# Patient Record
Sex: MALE | Race: WHITE | NOT HISPANIC OR LATINO | Employment: OTHER | URBAN - METROPOLITAN AREA
[De-identification: names, ages, dates, MRNs, and addresses within clinical notes are randomized per-mention and may not be internally consistent; named-entity substitution may affect disease eponyms.]

---

## 2023-03-29 DIAGNOSIS — I10 HYPERTENSION, UNSPECIFIED TYPE: Primary | ICD-10-CM

## 2023-03-29 PROBLEM — R53.83 FATIGUE: Status: ACTIVE | Noted: 2023-03-29

## 2023-03-29 PROBLEM — M25.552 LEFT HIP PAIN: Status: ACTIVE | Noted: 2023-03-29

## 2023-03-29 PROBLEM — G25.0 BENIGN FAMILIAL TREMOR: Status: ACTIVE | Noted: 2023-03-29

## 2023-03-29 PROBLEM — M10.9 GOUT: Status: ACTIVE | Noted: 2023-03-29

## 2023-03-29 PROBLEM — G47.52 REM BEHAVIORAL DISORDER: Status: ACTIVE | Noted: 2023-03-29

## 2023-03-29 PROBLEM — R17 ELEVATED BILIRUBIN: Status: ACTIVE | Noted: 2023-03-29

## 2023-03-29 PROBLEM — E55.9 VITAMIN D DEFICIENCY: Status: ACTIVE | Noted: 2023-03-29

## 2023-03-29 PROBLEM — G47.30 SLEEP APNEA: Status: ACTIVE | Noted: 2023-03-29

## 2023-03-29 PROBLEM — G47.61 PERIODIC LIMB MOVEMENT DISORDER (PLMD): Status: ACTIVE | Noted: 2023-03-29

## 2023-03-29 PROBLEM — M16.12 PRIMARY OSTEOARTHRITIS OF LEFT HIP: Status: ACTIVE | Noted: 2023-03-29

## 2023-03-29 PROBLEM — H91.90 HEARING LOSS: Status: ACTIVE | Noted: 2023-03-29

## 2023-03-29 PROBLEM — L30.9 ECZEMA: Status: ACTIVE | Noted: 2023-03-29

## 2023-03-29 PROBLEM — M17.12 ARTHRITIS OF KNEE, LEFT: Status: ACTIVE | Noted: 2023-03-29

## 2023-03-29 RX ORDER — LISINOPRIL 5 MG/1
5 TABLET ORAL DAILY
COMMUNITY
Start: 2023-03-08 | End: 2023-03-29 | Stop reason: SDUPTHER

## 2023-03-29 RX ORDER — PENICILLIN V POTASSIUM 500 MG/1
500 TABLET, FILM COATED ORAL 3 TIMES DAILY
COMMUNITY
Start: 2023-01-16 | End: 2023-10-12 | Stop reason: ALTCHOICE

## 2023-03-29 RX ORDER — MULTIVITAMIN
1 TABLET ORAL DAILY
COMMUNITY

## 2023-03-29 RX ORDER — MELOXICAM 15 MG/1
15 TABLET ORAL DAILY
COMMUNITY
End: 2023-11-30

## 2023-03-29 RX ORDER — LISINOPRIL 10 MG/1
10 TABLET ORAL DAILY
Qty: 30 TABLET | Refills: 5 | Status: SHIPPED | OUTPATIENT
Start: 2023-03-29 | End: 2023-08-23 | Stop reason: SDUPTHER

## 2023-03-29 RX ORDER — CALCIUM CARBONATE 300MG(750)
400 TABLET,CHEWABLE ORAL 2 TIMES DAILY
COMMUNITY
End: 2023-11-30

## 2023-03-29 RX ORDER — TACROLIMUS 1 MG/G
1 OINTMENT TOPICAL 2 TIMES DAILY
COMMUNITY
Start: 2021-08-05 | End: 2023-11-30

## 2023-03-29 RX ORDER — PROPRANOLOL HYDROCHLORIDE 20 MG/1
20 TABLET ORAL 2 TIMES DAILY
COMMUNITY
Start: 2015-07-15 | End: 2023-10-30 | Stop reason: SDUPTHER

## 2023-06-27 ENCOUNTER — TELEPHONE (OUTPATIENT)
Dept: PRIMARY CARE | Facility: CLINIC | Age: 63
End: 2023-06-27
Payer: MEDICARE

## 2023-06-27 NOTE — TELEPHONE ENCOUNTER
Patient called regarding his CPAP machine. He said since he switched insurances, he is required to prove compliance to them. He said paperwork was faxed over here for Rudy to fill out. It should be from Medicare. He just wants to follow up and see if that's been completed or not.

## 2023-08-23 DIAGNOSIS — I10 HYPERTENSION, UNSPECIFIED TYPE: ICD-10-CM

## 2023-08-24 RX ORDER — LISINOPRIL 10 MG/1
10 TABLET ORAL DAILY
Qty: 90 TABLET | Refills: 1 | Status: SHIPPED | OUTPATIENT
Start: 2023-08-24 | End: 2023-09-21 | Stop reason: SDUPTHER

## 2023-09-20 ENCOUNTER — TELEPHONE (OUTPATIENT)
Dept: PRIMARY CARE | Facility: CLINIC | Age: 63
End: 2023-09-20
Payer: MEDICARE

## 2023-09-21 DIAGNOSIS — I10 HYPERTENSION, UNSPECIFIED TYPE: ICD-10-CM

## 2023-09-21 RX ORDER — LISINOPRIL 10 MG/1
10 TABLET ORAL DAILY
Qty: 90 TABLET | Refills: 1 | Status: SHIPPED | OUTPATIENT
Start: 2023-09-21 | End: 2024-01-25 | Stop reason: SDUPTHER

## 2023-10-12 ENCOUNTER — OFFICE VISIT (OUTPATIENT)
Dept: PRIMARY CARE | Facility: CLINIC | Age: 63
End: 2023-10-12
Payer: MEDICARE

## 2023-10-12 VITALS
RESPIRATION RATE: 18 BRPM | WEIGHT: 247 LBS | BODY MASS INDEX: 36.48 KG/M2 | SYSTOLIC BLOOD PRESSURE: 132 MMHG | TEMPERATURE: 98.4 F | HEART RATE: 75 BPM | DIASTOLIC BLOOD PRESSURE: 70 MMHG | OXYGEN SATURATION: 97 %

## 2023-10-12 DIAGNOSIS — M10.9 ACUTE GOUT INVOLVING TOE OF RIGHT FOOT, UNSPECIFIED CAUSE: Primary | ICD-10-CM

## 2023-10-12 PROBLEM — M25.552 LEFT HIP PAIN: Status: RESOLVED | Noted: 2023-03-29 | Resolved: 2023-10-12

## 2023-10-12 PROBLEM — M16.12 PRIMARY OSTEOARTHRITIS OF LEFT HIP: Status: RESOLVED | Noted: 2023-03-29 | Resolved: 2023-10-12

## 2023-10-12 PROCEDURE — 1160F RVW MEDS BY RX/DR IN RCRD: CPT | Performed by: FAMILY MEDICINE

## 2023-10-12 PROCEDURE — 1159F MED LIST DOCD IN RCRD: CPT | Performed by: FAMILY MEDICINE

## 2023-10-12 PROCEDURE — 99214 OFFICE O/P EST MOD 30 MIN: CPT | Performed by: FAMILY MEDICINE

## 2023-10-12 PROCEDURE — 1036F TOBACCO NON-USER: CPT | Performed by: FAMILY MEDICINE

## 2023-10-12 RX ORDER — INDOMETHACIN 50 MG/1
50 CAPSULE ORAL
Qty: 20 CAPSULE | Refills: 0 | Status: SHIPPED | OUTPATIENT
Start: 2023-10-12 | End: 2023-10-22

## 2023-10-12 ASSESSMENT — ENCOUNTER SYMPTOMS
JOINT SWELLING: 1
WHEEZING: 0
FEVER: 0
SHORTNESS OF BREATH: 0
DIFFICULTY URINATING: 0
GASTROINTESTINAL NEGATIVE: 1
ARTHRALGIAS: 1
COUGH: 0
HEMATURIA: 0

## 2023-10-12 NOTE — PROGRESS NOTES
Subjective   Patient ID: Juvenal Bagley Jr. is a 63 y.o. male who presents for Foot Swelling. Swelling/pain/redness in right foot middle toe. PT suspects gout flare up. PT soaking in epsom salts with no relief    HPI     Review of Systems   Constitutional:  Negative for fever.   HENT: Negative.     Respiratory:  Negative for cough, shortness of breath and wheezing.    Cardiovascular:  Negative for chest pain.   Gastrointestinal: Negative.    Genitourinary:  Negative for difficulty urinating and hematuria.   Musculoskeletal:  Positive for arthralgias and joint swelling.        Gout symptoms x 1 wk. Right 3rd toe . Painful red and swollen. This is recurrent flare. Taking ibuprofen, ice, epsom salt voltaren topical.  Denies trauma or strenuous activity.       Objective   /70   Pulse 75   Temp 36.9 °C (98.4 °F)   Resp 18   Wt 112 kg (247 lb)   SpO2 97%   BMI 36.48 kg/m²     Physical Exam  Vitals and nursing note reviewed.   Constitutional:       General: He is not in acute distress.     Appearance: Normal appearance.   Cardiovascular:      Rate and Rhythm: Normal rate and regular rhythm.      Heart sounds: Normal heart sounds.   Pulmonary:      Effort: Pulmonary effort is normal.      Breath sounds: Normal breath sounds.   Musculoskeletal:         General: Swelling and tenderness present.      Comments: R 3rd toe with tenderness erythema edema and warmth. FROM. Neurovasc intact.  No evidence of trauma or injury or skin breakdown   Neurological:      Mental Status: He is alert.         Assessment/Plan   Problem List Items Addressed This Visit             ICD-10-CM    Gout - Primary M10.9     Pt to take med as directed  Rest, elevate foot, no strenuous activity  F/up if no improvement         Relevant Medications    indomethacin (Indocin) 50 mg capsule

## 2023-10-14 PROBLEM — I10 BENIGN ESSENTIAL HYPERTENSION: Status: ACTIVE | Noted: 2023-10-14

## 2023-10-14 PROBLEM — M25.562 KNEE PAIN, LEFT: Status: ACTIVE | Noted: 2023-10-14

## 2023-10-14 RX ORDER — AZELAIC ACID 0.15 G/G
GEL TOPICAL EVERY MORNING
COMMUNITY
End: 2023-11-30

## 2023-10-14 RX ORDER — CLOBETASOL PROPIONATE 0.5 MG/G
CREAM TOPICAL
COMMUNITY
End: 2023-11-30

## 2023-10-16 ENCOUNTER — OFFICE VISIT (OUTPATIENT)
Dept: PRIMARY CARE | Facility: CLINIC | Age: 63
End: 2023-10-16
Payer: MEDICARE

## 2023-10-16 ENCOUNTER — LAB (OUTPATIENT)
Dept: LAB | Facility: LAB | Age: 63
End: 2023-10-16
Payer: MEDICARE

## 2023-10-16 VITALS
BODY MASS INDEX: 36.92 KG/M2 | WEIGHT: 250 LBS | RESPIRATION RATE: 16 BRPM | DIASTOLIC BLOOD PRESSURE: 82 MMHG | SYSTOLIC BLOOD PRESSURE: 132 MMHG

## 2023-10-16 DIAGNOSIS — Z00.00 ROUTINE GENERAL MEDICAL EXAMINATION AT HEALTH CARE FACILITY: ICD-10-CM

## 2023-10-16 DIAGNOSIS — R17 ELEVATED BILIRUBIN: ICD-10-CM

## 2023-10-16 DIAGNOSIS — E55.9 VITAMIN D DEFICIENCY: ICD-10-CM

## 2023-10-16 DIAGNOSIS — Z13.220 LIPID SCREENING: ICD-10-CM

## 2023-10-16 DIAGNOSIS — I10 BENIGN ESSENTIAL HYPERTENSION: ICD-10-CM

## 2023-10-16 DIAGNOSIS — R53.83 OTHER FATIGUE: ICD-10-CM

## 2023-10-16 DIAGNOSIS — Z13.220 LIPID SCREENING: Primary | ICD-10-CM

## 2023-10-16 DIAGNOSIS — Z23 NEED FOR VACCINATION: ICD-10-CM

## 2023-10-16 LAB
25(OH)D3 SERPL-MCNC: 63 NG/ML (ref 30–100)
ALBUMIN SERPL BCP-MCNC: 4.6 G/DL (ref 3.4–5)
ALP SERPL-CCNC: 65 U/L (ref 33–136)
ALT SERPL W P-5'-P-CCNC: 34 U/L (ref 10–52)
ANION GAP SERPL CALC-SCNC: 13 MMOL/L (ref 10–20)
AST SERPL W P-5'-P-CCNC: 30 U/L (ref 9–39)
BASOPHILS # BLD AUTO: 0.08 X10*3/UL (ref 0–0.1)
BASOPHILS NFR BLD AUTO: 1.1 %
BILIRUB SERPL-MCNC: 1.1 MG/DL (ref 0–1.2)
BUN SERPL-MCNC: 16 MG/DL (ref 6–23)
CALCIUM SERPL-MCNC: 9.6 MG/DL (ref 8.6–10.3)
CHLORIDE SERPL-SCNC: 104 MMOL/L (ref 98–107)
CHOLEST SERPL-MCNC: 157 MG/DL (ref 0–199)
CHOLESTEROL/HDL RATIO: 4.2
CO2 SERPL-SCNC: 27 MMOL/L (ref 21–32)
CREAT SERPL-MCNC: 0.97 MG/DL (ref 0.5–1.3)
EOSINOPHIL # BLD AUTO: 0.37 X10*3/UL (ref 0–0.7)
EOSINOPHIL NFR BLD AUTO: 4.9 %
ERYTHROCYTE [DISTWIDTH] IN BLOOD BY AUTOMATED COUNT: 12.7 % (ref 11.5–14.5)
GFR SERPL CREATININE-BSD FRML MDRD: 87 ML/MIN/1.73M*2
GLUCOSE SERPL-MCNC: 83 MG/DL (ref 74–99)
HCT VFR BLD AUTO: 43.6 % (ref 41–52)
HDLC SERPL-MCNC: 37 MG/DL
HGB BLD-MCNC: 14.4 G/DL (ref 13.5–17.5)
IMM GRANULOCYTES # BLD AUTO: 0.08 X10*3/UL (ref 0–0.7)
IMM GRANULOCYTES NFR BLD AUTO: 1.1 % (ref 0–0.9)
LDLC SERPL CALC-MCNC: 93 MG/DL
LYMPHOCYTES # BLD AUTO: 2.07 X10*3/UL (ref 1.2–4.8)
LYMPHOCYTES NFR BLD AUTO: 27.6 %
MCH RBC QN AUTO: 31.6 PG (ref 26–34)
MCHC RBC AUTO-ENTMCNC: 33 G/DL (ref 32–36)
MCV RBC AUTO: 96 FL (ref 80–100)
MONOCYTES # BLD AUTO: 0.78 X10*3/UL (ref 0.1–1)
MONOCYTES NFR BLD AUTO: 10.4 %
NEUTROPHILS # BLD AUTO: 4.12 X10*3/UL (ref 1.2–7.7)
NEUTROPHILS NFR BLD AUTO: 54.9 %
NON HDL CHOLESTEROL: 120 MG/DL (ref 0–149)
NRBC BLD-RTO: 0 /100 WBCS (ref 0–0)
PLATELET # BLD AUTO: 240 X10*3/UL (ref 150–450)
PMV BLD AUTO: 11.3 FL (ref 7.5–11.5)
POTASSIUM SERPL-SCNC: 4.4 MMOL/L (ref 3.5–5.3)
PROT SERPL-MCNC: 7.1 G/DL (ref 6.4–8.2)
RBC # BLD AUTO: 4.55 X10*6/UL (ref 4.5–5.9)
SODIUM SERPL-SCNC: 140 MMOL/L (ref 136–145)
TRIGL SERPL-MCNC: 137 MG/DL (ref 0–149)
TSH SERPL-ACNC: 1.91 MIU/L (ref 0.44–3.98)
VLDL: 27 MG/DL (ref 0–40)
WBC # BLD AUTO: 7.5 X10*3/UL (ref 4.4–11.3)

## 2023-10-16 PROCEDURE — 1160F RVW MEDS BY RX/DR IN RCRD: CPT | Performed by: NURSE PRACTITIONER

## 2023-10-16 PROCEDURE — 80061 LIPID PANEL: CPT

## 2023-10-16 PROCEDURE — 84443 ASSAY THYROID STIM HORMONE: CPT

## 2023-10-16 PROCEDURE — 1036F TOBACCO NON-USER: CPT | Performed by: NURSE PRACTITIONER

## 2023-10-16 PROCEDURE — 3079F DIAST BP 80-89 MM HG: CPT | Performed by: NURSE PRACTITIONER

## 2023-10-16 PROCEDURE — 80053 COMPREHEN METABOLIC PANEL: CPT

## 2023-10-16 PROCEDURE — 85025 COMPLETE CBC W/AUTO DIFF WBC: CPT

## 2023-10-16 PROCEDURE — 1159F MED LIST DOCD IN RCRD: CPT | Performed by: NURSE PRACTITIONER

## 2023-10-16 PROCEDURE — G0402 INITIAL PREVENTIVE EXAM: HCPCS | Performed by: NURSE PRACTITIONER

## 2023-10-16 PROCEDURE — 90662 IIV NO PRSV INCREASED AG IM: CPT | Performed by: NURSE PRACTITIONER

## 2023-10-16 PROCEDURE — 3077F SYST BP >= 140 MM HG: CPT | Performed by: NURSE PRACTITIONER

## 2023-10-16 PROCEDURE — 1170F FXNL STATUS ASSESSED: CPT | Performed by: NURSE PRACTITIONER

## 2023-10-16 PROCEDURE — G0008 ADMIN INFLUENZA VIRUS VAC: HCPCS | Performed by: NURSE PRACTITIONER

## 2023-10-16 PROCEDURE — 82306 VITAMIN D 25 HYDROXY: CPT

## 2023-10-16 PROCEDURE — 36415 COLL VENOUS BLD VENIPUNCTURE: CPT

## 2023-10-16 ASSESSMENT — ENCOUNTER SYMPTOMS
DEPRESSION: 0
OCCASIONAL FEELINGS OF UNSTEADINESS: 0
LOSS OF SENSATION IN FEET: 0

## 2023-10-16 ASSESSMENT — PATIENT HEALTH QUESTIONNAIRE - PHQ9
2. FEELING DOWN, DEPRESSED OR HOPELESS: NOT AT ALL
1. LITTLE INTEREST OR PLEASURE IN DOING THINGS: NOT AT ALL
SUM OF ALL RESPONSES TO PHQ9 QUESTIONS 1 AND 2: 0

## 2023-10-16 ASSESSMENT — ACTIVITIES OF DAILY LIVING (ADL)
BATHING: INDEPENDENT
TAKING_MEDICATION: INDEPENDENT
DOING_HOUSEWORK: INDEPENDENT
GROCERY_SHOPPING: INDEPENDENT
DRESSING: INDEPENDENT
MANAGING_FINANCES: INDEPENDENT

## 2023-10-16 NOTE — PROGRESS NOTES
Subjective   Reason for Visit: Juvenal Bagley Jr. is an 63 y.o. male here for a Medicare Wellness visit.     Past Medical, Surgical, and Family History reviewed and updated in chart.Sushant in 2021    Reviewed all medications by prescribing practitioner or clinical pharmacist (such as prescriptions, OTCs, herbal therapies and supplements) and documented in the medical record.    He is taking ibuprofen prn for IT band tendonitis and bursitis of hips. Biking daily, and volunteering with bike co-ops. Feels well overall, no problems or concerns.    HPI    Patient Care Team:  JUSTO Fishman-CNP as PCP - General (Internal Medicine)     Review of Systems   Constitutional:  Negative for chills, fatigue and fever.   HENT:  Negative for congestion, ear pain, rhinorrhea, sinus pressure and sore throat.    Eyes:  Negative for pain, discharge and itching.   Respiratory:  Negative for cough, shortness of breath and wheezing.    Cardiovascular:  Negative for chest pain and palpitations.   Gastrointestinal:  Negative for constipation, diarrhea, nausea and vomiting.   Genitourinary:  Negative for difficulty urinating and dysuria.   Musculoskeletal:  Positive for arthralgias. Negative for back pain, joint swelling and myalgias.   Skin:  Negative for color change.   Neurological:  Negative for headaches.   Hematological:  Negative for adenopathy.   Psychiatric/Behavioral:  Negative for decreased concentration. The patient is not nervous/anxious.        Objective   Vitals:  /82   Resp 16   Wt 113 kg (250 lb)   BMI 36.92 kg/m²       Physical Exam  Constitutional:       General: He is not in acute distress.     Appearance: He is not ill-appearing.   HENT:      Head: Normocephalic and atraumatic.      Mouth/Throat:      Mouth: Mucous membranes are moist.      Pharynx: Oropharynx is clear.   Eyes:      Conjunctiva/sclera: Conjunctivae normal.      Pupils: Pupils are equal, round, and reactive to light.   Cardiovascular:       Rate and Rhythm: Normal rate and regular rhythm.      Pulses: Normal pulses.      Heart sounds: Normal heart sounds.   Pulmonary:      Effort: Pulmonary effort is normal. No respiratory distress.      Breath sounds: Normal breath sounds.   Abdominal:      General: Bowel sounds are normal.      Palpations: Abdomen is soft.      Tenderness: There is no abdominal tenderness.   Musculoskeletal:         General: Normal range of motion.   Skin:     General: Skin is warm and dry.   Neurological:      General: No focal deficit present.      Mental Status: He is alert and oriented to person, place, and time.   Psychiatric:         Mood and Affect: Mood normal.         Behavior: Behavior normal.         Thought Content: Thought content normal.         Judgment: Judgment normal.         Assessment/Plan   Problem List Items Addressed This Visit       Elevated bilirubin    Relevant Orders    Comprehensive Metabolic Panel (Completed)    Fatigue    Relevant Orders    TSH with reflex to Free T4 if abnormal (Completed)    Vitamin D deficiency    Relevant Orders    Vitamin D 25-Hydroxy,Total (for eval of Vitamin D levels) (Completed)    Benign essential hypertension    Relevant Orders    CBC and Auto Differential (Completed)    Comprehensive Metabolic Panel (Completed)     Other Visit Diagnoses       Lipid screening    -  Primary    Relevant Orders    Lipid Panel (Completed)    Need for vaccination        Relevant Orders    Flu vaccine, high dose seasonal, preservative free (Completed)    Routine general medical examination at health care facility        Relevant Orders    1 Year Follow Up In [facility] - PCP - Wellness Exam

## 2023-10-30 ENCOUNTER — OFFICE VISIT (OUTPATIENT)
Dept: NEUROLOGY | Facility: CLINIC | Age: 63
End: 2023-10-30
Payer: MEDICARE

## 2023-10-30 VITALS
SYSTOLIC BLOOD PRESSURE: 161 MMHG | DIASTOLIC BLOOD PRESSURE: 81 MMHG | HEIGHT: 69 IN | BODY MASS INDEX: 35.4 KG/M2 | WEIGHT: 239 LBS | HEART RATE: 59 BPM

## 2023-10-30 DIAGNOSIS — G25.0 ESSENTIAL TREMOR: Primary | ICD-10-CM

## 2023-10-30 PROCEDURE — 1036F TOBACCO NON-USER: CPT | Performed by: PSYCHIATRY & NEUROLOGY

## 2023-10-30 PROCEDURE — 99213 OFFICE O/P EST LOW 20 MIN: CPT | Performed by: PSYCHIATRY & NEUROLOGY

## 2023-10-30 PROCEDURE — 1160F RVW MEDS BY RX/DR IN RCRD: CPT | Performed by: PSYCHIATRY & NEUROLOGY

## 2023-10-30 PROCEDURE — 3077F SYST BP >= 140 MM HG: CPT | Performed by: PSYCHIATRY & NEUROLOGY

## 2023-10-30 PROCEDURE — 3079F DIAST BP 80-89 MM HG: CPT | Performed by: PSYCHIATRY & NEUROLOGY

## 2023-10-30 PROCEDURE — 1159F MED LIST DOCD IN RCRD: CPT | Performed by: PSYCHIATRY & NEUROLOGY

## 2023-10-30 RX ORDER — PROPRANOLOL HYDROCHLORIDE 20 MG/1
20 TABLET ORAL 2 TIMES DAILY
Qty: 180 TABLET | Refills: 3 | Status: SHIPPED | OUTPATIENT
Start: 2023-10-30 | End: 2024-10-29

## 2023-10-30 ASSESSMENT — FAHN TOLOSA MARTIN TREMOR (FTM)
DRAWING C TOTAL SCORE: 1
UE FINGER TO NOSE AND OTHER ACTIONS: 1
UE ARMS OUTSTRECHED WRISTS MILDLY EXTENDED FINGERS SPREAD APART: 0
TOUNGE AT REST: 0
FACE AT POSTURE WHEN STANDING OR SITTING: 0
UE ARM AT REST: 0
DRAWING C LEFT SCORE: 1
LE LEG FLEXED AT HIPS AND KNEES AT POSTURE: 0
DRAWING A TOTAL SCORE: 1
DRAWING A RIGHT SCORE: 0
RUE TOTAL SCORE: 2
TOUNGE TOTAL SCORE: 0
RUE TOTAL SCORE: 1
UE ARMS OUTSTRECHED WRISTS MILDLY EXTENDED FINGERS SPREAD APART: 0
DRAWING B RGHT SCORE: 0
VOICE TOTAL SCORE: 1
VOICE IN ACTION: 1
LE TOE TO FINGER IN A FLEXED POSTURE: 0
RLE TOTAL SCORE: 0
UE ARM AT REST: 1
LE LEG FLEXED AT HIPS AND KNEES AT POSTURE: 0
LE TOE TO FINGER IN A FLEXED POSTURE: 0
TRUNK  WHEN SITTING OR STANDING: 0
TOUNGE WHEN PROTUDED: 0
DRAWING C RIGHT SCORE: 0
LLE TOTAL SCORE: 0
LE AT REST: 0
HANDWRITING WITH DOMINANT HAND: 1
DRAWING B LEFT SCORE: 3
UE FINGER TO NOSE AND OTHER ACTIONS: 1
LE AT REST: 0
DRAWING B TOTAL SCORE: 3
HEAD AT POSTURE WHEN STANDING OR SITTING: 0
DRAWING A LEFT SCORE: 1

## 2023-10-30 ASSESSMENT — ENCOUNTER SYMPTOMS
DEPRESSION: 0
OCCASIONAL FEELINGS OF UNSTEADINESS: 0
LOSS OF SENSATION IN FEET: 0
TREMORS: 1

## 2023-10-30 NOTE — PATIENT INSTRUCTIONS
It was nice to see you today!    Continue current dose of Propranolol.     RTC in 9-12 months

## 2023-10-30 NOTE — LETTER
October 30, 2023     Rudy Handley, APRN-CNP  [address]  [address]  [facility] [address]    Patient: Juvenal Bagley Jr.   YOB: 1960   Date of Visit: 10/30/2023       Dear Dr. Rudy Handley, APRN-CNP:    Thank you for referring Juvenal Bagley to me for evaluation. Below are my notes for this consultation.  If you have questions, please do not hesitate to call me. I look forward to following your patient along with you.       Sincerely,     Michelle Torres MD      CC: No Recipients  ______________________________________________________________________________________    Subjective    Juvenal Bagley Jr. is a right handed  63 y.o. year old male who presents with Tremors.   Visit type: follow up visit     HPI    Here for follow up of essential tremors.   The tremors are primarily annoying.  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    Has kinetic tremor affecting predominantly his non dominant hand, treated w low dose propranolol, (to prevent impact on exercise tolerance).   Handwriting is so so. 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   He can do everything, has to watch when he holds a plate.   He continues to exercise 6 days a week,  He has had a hip surgery since last visit,  Gait is good, no shuffling.   NO stiffness and slowness.       He did have reduced arm swing, but no other bradykinesia or rigidity to suggest parkinsonism given the asymmetric tremor, however this is being monitored.         Patient Health Questionnaire-2 Score: 0          Review of Systems   Neurological:  Positive for tremors.     All other system have been reviewed and are negative for complaint.  Objective  Neurological Exam  Mental Status  Awake, alert and oriented to person, place and time.    Cranial Nerves  CN VII: Full and symmetric facial movement.  CN XII: Tongue midline  without atrophy or fasciculations.    Motor    Moves all extremeties symmetrically and anti-gravity.    Coordination  Right: Finger-to-nose normal.Left: Finger-to-nose normal.    Gait  Casual gait is normal including stance, stride, and arm swing.      Movement disorder specific exam:  See Fahn Mio Rasheed Tremor rating scale.     Kinetic tremor L UE w slight spill over into rest. Archimedes spirals tremulous on L,   Handwriting is legible.   No bradykinesia.     Vitals:    10/30/23 1533   BP: 161/81   Pulse: 59                                         Assessment/Plan  Diagnoses and all orders for this visit:  Essential tremor  -     propranolol (Inderal) 20 mg tablet; Take 1 tablet (20 mg) by mouth 2 times a day.      63 year old male here for follow up of  ET, overall tremors are stable, and he is managing well on low dose propranolol. Exercises extensively, so if needed, may need to add primidone if needed in future.   Overall doing well, staying cognitively and physically active   RTC in 12 months

## 2023-10-30 NOTE — PROGRESS NOTES
Subjective     Juvenal Bagley Jr. is a right handed  63 y.o. year old male who presents with Tremors.   Visit type: follow up visit     HPI    Here for follow up of essential tremors.   The tremors are primarily annoying.  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    Has kinetic tremor affecting predominantly his non dominant hand, treated w low dose propranolol, (to prevent impact on exercise tolerance).   Handwriting is so so. 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   He can do everything, has to watch when he holds a plate.   He continues to exercise 6 days a week,  He has had a hip surgery since last visit,  Gait is good, no shuffling.   NO stiffness and slowness.       He did have reduced arm swing, but no other bradykinesia or rigidity to suggest parkinsonism given the asymmetric tremor, however this is being monitored.         Patient Health Questionnaire-2 Score: 0          Review of Systems   Neurological:  Positive for tremors.     All other system have been reviewed and are negative for complaint.  Objective   Neurological Exam  Mental Status  Awake, alert and oriented to person, place and time.    Cranial Nerves  CN VII: Full and symmetric facial movement.  CN XII: Tongue midline without atrophy or fasciculations.    Motor    Moves all extremeties symmetrically and anti-gravity.    Coordination  Right: Finger-to-nose normal.Left: Finger-to-nose normal.    Gait  Casual gait is normal including stance, stride, and arm swing.      Movement disorder specific exam:  See Fahn Mio Rasheed Tremor rating scale.     Kinetic tremor L UE w slight spill over into rest. Archimedes spirals tremulous on L,   Handwriting is legible.   No bradykinesia.     Vitals:    10/30/23 1533   BP: 161/81   Pulse: 59                                         Assessment/Plan   Diagnoses and all  orders for this visit:  Essential tremor  -     propranolol (Inderal) 20 mg tablet; Take 1 tablet (20 mg) by mouth 2 times a day.      63 year old male here for follow up of  ET, overall tremors are stable, and he is managing well on low dose propranolol. Exercises extensively, so if needed, may need to add primidone if needed in future.   Overall doing well, staying cognitively and physically active   RTC in 12 months

## 2023-11-03 ASSESSMENT — ENCOUNTER SYMPTOMS
NERVOUS/ANXIOUS: 0
DIARRHEA: 0
SORE THROAT: 0
HEADACHES: 0
DIFFICULTY URINATING: 0
EYE ITCHING: 0
COUGH: 0
DECREASED CONCENTRATION: 0
EYE DISCHARGE: 0
CONSTIPATION: 0
BACK PAIN: 0
PALPITATIONS: 0
DYSURIA: 0
MYALGIAS: 0
RHINORRHEA: 0
WHEEZING: 0
COLOR CHANGE: 0
ARTHRALGIAS: 1
VOMITING: 0
FEVER: 0
SINUS PRESSURE: 0
CHILLS: 0
FATIGUE: 0
NAUSEA: 0
JOINT SWELLING: 0
ADENOPATHY: 0
SHORTNESS OF BREATH: 0
EYE PAIN: 0

## 2023-11-03 ASSESSMENT — PATIENT HEALTH QUESTIONNAIRE - PHQ9
1. LITTLE INTEREST OR PLEASURE IN DOING THINGS: NOT AT ALL
SUM OF ALL RESPONSES TO PHQ9 QUESTIONS 1 AND 2: 0
2. FEELING DOWN, DEPRESSED OR HOPELESS: NOT AT ALL

## 2023-11-03 ASSESSMENT — ACTIVITIES OF DAILY LIVING (ADL)
GROCERY_SHOPPING: INDEPENDENT
DRESSING: INDEPENDENT
MANAGING_FINANCES: INDEPENDENT
BATHING: INDEPENDENT
TAKING_MEDICATION: INDEPENDENT
DOING_HOUSEWORK: INDEPENDENT

## 2023-11-10 ENCOUNTER — TELEPHONE (OUTPATIENT)
Dept: PRIMARY CARE | Facility: CLINIC | Age: 63
End: 2023-11-10
Payer: MEDICARE

## 2023-11-10 NOTE — TELEPHONE ENCOUNTER
Maxim called to let you know that he has been monitoring his bp and it has been improving, he said that while he is exercising he feels fine, after exercise he is exhausted and when walking up steps he is fatigued.  He wants to know if you want to see him again or refer him to cardiology

## 2023-11-21 DIAGNOSIS — I10 BENIGN ESSENTIAL HYPERTENSION: Primary | ICD-10-CM

## 2023-11-30 ENCOUNTER — OFFICE VISIT (OUTPATIENT)
Dept: CARDIOLOGY | Facility: CLINIC | Age: 63
End: 2023-11-30
Payer: MEDICARE

## 2023-11-30 VITALS
HEIGHT: 69 IN | SYSTOLIC BLOOD PRESSURE: 126 MMHG | HEART RATE: 63 BPM | BODY MASS INDEX: 34.66 KG/M2 | WEIGHT: 234 LBS | OXYGEN SATURATION: 97 % | DIASTOLIC BLOOD PRESSURE: 72 MMHG

## 2023-11-30 DIAGNOSIS — R94.31 ABNORMAL EKG: Primary | ICD-10-CM

## 2023-11-30 DIAGNOSIS — Z82.49 FAMILY HISTORY OF ISCHEMIC HEART DISEASE: ICD-10-CM

## 2023-11-30 DIAGNOSIS — I51.7 LVH (LEFT VENTRICULAR HYPERTROPHY): ICD-10-CM

## 2023-11-30 DIAGNOSIS — I10 BENIGN ESSENTIAL HYPERTENSION: ICD-10-CM

## 2023-11-30 PROCEDURE — 3074F SYST BP LT 130 MM HG: CPT | Performed by: NURSE PRACTITIONER

## 2023-11-30 PROCEDURE — 99204 OFFICE O/P NEW MOD 45 MIN: CPT | Performed by: NURSE PRACTITIONER

## 2023-11-30 PROCEDURE — 1160F RVW MEDS BY RX/DR IN RCRD: CPT | Performed by: NURSE PRACTITIONER

## 2023-11-30 PROCEDURE — 1036F TOBACCO NON-USER: CPT | Performed by: NURSE PRACTITIONER

## 2023-11-30 PROCEDURE — 3078F DIAST BP <80 MM HG: CPT | Performed by: NURSE PRACTITIONER

## 2023-11-30 PROCEDURE — 1159F MED LIST DOCD IN RCRD: CPT | Performed by: NURSE PRACTITIONER

## 2023-11-30 PROCEDURE — 93000 ELECTROCARDIOGRAM COMPLETE: CPT | Performed by: NURSE PRACTITIONER

## 2023-11-30 NOTE — PATIENT INSTRUCTIONS
- Echocardiogram (ultrasound of your heart) to assess the function as well as for any valve abnormalities  - Coronary Calcium Scan:  a specialized X-ray test that provides pictures of your heart that can help your Provider detect and measure calcium-containing plaque in your arteries. Plaque inside the arteries of your heart can grow and restrict blood flow to the muscles of your heart. Measuring calcified plaque with a heart scan may allow your Provider to identify possible coronary artery disease before you have signs and symptoms.  - phone call follow after tests to review results    It was my pleasure to meet you. I look forward to being your cardiac Nurse Practitioner. I am a huge believer in communicating with my patients. Please contact me at any time, if anything is not clear to you regarding anything we have discussed, or if new questions occur to you.

## 2023-11-30 NOTE — PROGRESS NOTES
"Name : Juvenal Bagley Jr.   : 1960   MRN : [id number]   ENC Date : 2023    CC: NPV- Abnormal EKG     HPI:    Juvenal Bagley Jr. is a healthy 63 y.o. male with PMHx sig for essential tremor who presents today with concern for abnormal EKG.    EKG done at routine visit. C/F LVH. Echo in  done during evaluation for pericarditis documents LVH. He is very active. Riding his stationary bike \"[employer]eloton on steroids\" through courses 3x a week; upwards of 6 to 15 miles as well as working out on the [employer] w/o any chest pain, pressure, SOB/FELICIANO, PND, orthopnea, LE edema, palpitations, lightheadedness, dizziness, or syncope.     As we were discussing, he & his wife were concerned that he had a short period of time about 1 month ago when he felt very fatigued after his usual exercise, winded but this has since subsided.    PMH:  ES  Osteoarthritis left hip    PSH:  Appendectomy   Left total hip arthroplasty     SHx:  Tobacco- Never  ETOH- none  Drugs- none  Exercise-   3x a week a week he does  1hr  3x a week elliptical     FHx:  Father- MI at 43 y/o (underlying health issues & smoker)  Mother- Valve surgery late 50s; scarlet fever     Allergies:  Cefaclor, Doxycycline, and Sulfa (sulfonamide antibiotics)    Outpatient Medications:  Current Outpatient Medications   Medication Instructions    glucosamine/chondr quach A sod (OSTEO BI-FLEX ORAL) 1 tablet, oral, 2 times daily    lisinopril 10 mg, oral, Daily    multivitamin tablet 1 tablet, oral, Daily    propranolol (INDERAL) 20 mg, oral, 2 times daily       Last Recorded Vitals:  Vitals:    23 0809   BP: 126/72   BP Location: Left arm   Patient Position: Sitting   Pulse: 63   SpO2: 97%   Weight: 106 kg (234 lb)   Height: 1.753 m (5' 9\")     Physical Exam:  On exam Mr. Bagley appears his stated age, is alert and oriented x3, and in no acute distress. His sclera are anicteric and his oropharynx has moist mucous membranes. His neck is supple and without " thyromegaly. The JVP is ~5 cm of water above the right atrium. His cardiac exam has regular rhythm, normal S1, S2. No S3/4. There are no murmurs. His lungs are clear to auscultation bilaterally and there is no dullness to percussion. His abdomen is soft, nontender with normoactive bowel sounds. There is no HJR. The extremities are warm and without edema. The skin is dry. There is no rash present. The distal pulses are 2-3+ in all four extremities. His mood and affect are appropriate for todays encounter.      Last Labs:  CBC -  Lab Results   Component Value Date    WBC 7.5 10/16/2023    HGB 14.4 10/16/2023    HCT 43.6 10/16/2023    MCV 96 10/16/2023     10/16/2023       CMP -  Lab Results   Component Value Date    CALCIUM 9.6 10/16/2023    PROT 7.1 10/16/2023    ALBUMIN 4.6 10/16/2023    AST 30 10/16/2023    ALT 34 10/16/2023    ALKPHOS 65 10/16/2023    BILITOT 1.1 10/16/2023       LIPID PANEL -   Lab Results   Component Value Date    CHOL 157 10/16/2023    TRIG 137 10/16/2023    HDL 37.0 10/16/2023    CHHDL 4.2 10/16/2023    LDLF 104 (H) 09/03/2021    VLDL 27 10/16/2023    NHDL 120 10/16/2023       RENAL FUNCTION PANEL -   Lab Results   Component Value Date    GLUCOSE 83 10/16/2023     10/16/2023    K 4.4 10/16/2023     10/16/2023    CO2 27 10/16/2023    ANIONGAP 13 10/16/2023    BUN 16 10/16/2023    CREATININE 0.97 10/16/2023    CALCIUM 9.6 10/16/2023    ALBUMIN 4.6 10/16/2023        Last Cardiology Tests:  Echo 2017    I have reviewed the above labs & diagnostics    Assessment/Plan:  Abnormal EKG  LVH  Family h/o heart disease    Discussed monitoring clinically vs repeating echocardiogram for surveillance. [place] decision making to repeat echo. Also recommend CT cardiac score test for risk stratification given family history.    PC follow up after testing      Tracy M Schwab, APRN-CNP

## 2023-12-16 ENCOUNTER — HOSPITAL ENCOUNTER (OUTPATIENT)
Dept: RADIOLOGY | Facility: HOSPITAL | Age: 63
Discharge: HOME | End: 2023-12-16
Payer: MEDICARE

## 2023-12-16 DIAGNOSIS — Z82.49 FAMILY HISTORY OF ISCHEMIC HEART DISEASE: ICD-10-CM

## 2023-12-16 PROCEDURE — 75571 CT HRT W/O DYE W/CA TEST: CPT

## 2024-01-24 ENCOUNTER — HOSPITAL ENCOUNTER (OUTPATIENT)
Dept: CARDIOLOGY | Facility: HOSPITAL | Age: 64
Discharge: HOME | End: 2024-01-24
Payer: MEDICARE

## 2024-01-24 DIAGNOSIS — I51.7 LVH (LEFT VENTRICULAR HYPERTROPHY): ICD-10-CM

## 2024-01-24 LAB
AORTIC VALVE PEAK VELOCITY: 1.18 M/S
AV PEAK GRADIENT: 5.6 MMHG
AVA (PEAK VEL): 2.52 CM2
EJECTION FRACTION APICAL 4 CHAMBER: 51.1
EJECTION FRACTION: 53 %
LEFT VENTRICLE INTERNAL DIMENSION DIASTOLE: 4.26 CM (ref 3.5–6)
LEFT VENTRICULAR OUTFLOW TRACT DIAMETER: 2 CM
MITRAL VALVE E/A RATIO: 0.59
MITRAL VALVE E/E' RATIO: 9.9
RIGHT VENTRICLE PEAK SYSTOLIC PRESSURE: 31.5 MMHG
TRICUSPID ANNULAR PLANE SYSTOLIC EXCURSION: 1.6 CM

## 2024-01-24 PROCEDURE — 93306 TTE W/DOPPLER COMPLETE: CPT | Performed by: INTERNAL MEDICINE

## 2024-01-24 PROCEDURE — 93306 TTE W/DOPPLER COMPLETE: CPT

## 2024-01-25 DIAGNOSIS — I10 HYPERTENSION, UNSPECIFIED TYPE: ICD-10-CM

## 2024-01-25 RX ORDER — LISINOPRIL 20 MG/1
20 TABLET ORAL DAILY
Qty: 90 TABLET | Refills: 1 | Status: SHIPPED | OUTPATIENT
Start: 2024-01-25 | End: 2024-07-23

## 2024-02-07 ENCOUNTER — APPOINTMENT (OUTPATIENT)
Dept: CARDIOLOGY | Facility: CLINIC | Age: 64
End: 2024-02-07
Payer: MEDICARE

## 2024-02-09 ENCOUNTER — TELEMEDICINE (OUTPATIENT)
Dept: CARDIOLOGY | Facility: CLINIC | Age: 64
End: 2024-02-09
Payer: MEDICARE

## 2024-02-09 DIAGNOSIS — I51.7 LVH (LEFT VENTRICULAR HYPERTROPHY): Primary | ICD-10-CM

## 2024-02-09 DIAGNOSIS — I77.819 AORTIC ECTASIA (CMS-HCC): ICD-10-CM

## 2024-02-09 PROCEDURE — 1036F TOBACCO NON-USER: CPT | Performed by: NURSE PRACTITIONER

## 2024-02-09 PROCEDURE — 99213 OFFICE O/P EST LOW 20 MIN: CPT | Performed by: NURSE PRACTITIONER

## 2024-02-09 PROCEDURE — 1159F MED LIST DOCD IN RCRD: CPT | Performed by: NURSE PRACTITIONER

## 2024-02-09 NOTE — PROGRESS NOTES
"Name : Juvenal Bagley Jr.   : 1960   MRN : [id number]   ENC Date : 2024    CC: Follow up testing     HPI:    Juvenal Bagley Jr. is a healthy 63 y.o. male with PMHx sig for essential tremor who presents today with his wife on follow up testing for concern for abnormal EKG.    EKG done at routine visit. C/F LVH. Echo in 2017 done during evaluation for pericarditis documents LVH. He is very active. 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     PMH:  ES  Osteoarthritis left hip    PSH:  Appendectomy   Left total hip arthroplasty     SHx:  Tobacco- Never  ETOH- none  Drugs- none  Exercise-   3x a week a week he does  1hr  3x a week elliptical     FHx:  Father- MI at 45 y/o (underlying health issues & smoker)  Mother- Valve surgery late 50s; scarlet fever     Allergies:  Cefaclor, Doxycycline, and Sulfa (sulfonamide antibiotics)    Outpatient Medications:  Current Outpatient Medications   Medication Instructions    glucosamine/chondr quach A sod (OSTEO BI-FLEX ORAL) 1 tablet, oral, 2 times daily    lisinopril 20 mg, oral, Daily    multivitamin tablet 1 tablet, oral, Daily    propranolol (INDERAL) 20 mg, oral, 2 times daily       Last Recorded Vitals:  There were no vitals filed for this visit.    Physical Exam:  A+Ox3, NAD     Last Labs:  CBC -  Lab Results   Component Value Date    WBC 7.5 10/16/2023    HGB 14.4 10/16/2023    HCT 43.6 10/16/2023    MCV 96 10/16/2023     10/16/2023       CMP -  Lab Results   Component Value Date    CALCIUM 9.6 10/16/2023    PROT 7.1 10/16/2023    ALBUMIN 4.6 10/16/2023    AST 30 10/16/2023    ALT 34 10/16/2023    ALKPHOS 65 10/16/2023    BILITOT 1.1 10/16/2023       LIPID PANEL -   Lab Results   Component Value Date    CHOL 157 10/16/2023    TRIG " 137 10/16/2023    HDL 37.0 10/16/2023    CHHDL 4.2 10/16/2023    LDLF 104 (H) 09/03/2021    VLDL 27 10/16/2023    NHDL 120 10/16/2023       RENAL FUNCTION PANEL -   Lab Results   Component Value Date    GLUCOSE 83 10/16/2023     10/16/2023    K 4.4 10/16/2023     10/16/2023    CO2 27 10/16/2023    ANIONGAP 13 10/16/2023    BUN 16 10/16/2023    CREATININE 0.97 10/16/2023    CALCIUM 9.6 10/16/2023    ALBUMIN 4.6 10/16/2023        Last Cardiology Tests:  Echo 1/24/24: EF 50-55% low delvin, impaired relaxation, mild asymmetric LVH, RVSP 32 mm, mild to mod AR    CAC score 12/16/24: 0. The visualized mid/lower ascending thoracic aorta measures 3.9 cm in diameter.    Echo 2017: EF 55-60%, mild TR    I have reviewed the above labs & diagnostics    Assessment/Plan:  LVH.   Thoracic Aortic ectasia    Modify risk factors, tight control of BP. Will repeat echo next year to make sure EF is stable. CTA Chest for further evaluation of Aorta.    Follow up with testing prior to visit next year.    Tracy M Schwab, APRN-CNP

## 2024-02-20 ENCOUNTER — OFFICE VISIT (OUTPATIENT)
Dept: PRIMARY CARE | Facility: CLINIC | Age: 64
End: 2024-02-20
Payer: MEDICARE

## 2024-02-20 VITALS
DIASTOLIC BLOOD PRESSURE: 92 MMHG | WEIGHT: 239 LBS | TEMPERATURE: 97.8 F | SYSTOLIC BLOOD PRESSURE: 142 MMHG | HEART RATE: 86 BPM | OXYGEN SATURATION: 97 % | RESPIRATION RATE: 20 BRPM | BODY MASS INDEX: 35.29 KG/M2

## 2024-02-20 DIAGNOSIS — U07.1 COVID-19: Primary | ICD-10-CM

## 2024-02-20 PROCEDURE — 1159F MED LIST DOCD IN RCRD: CPT | Performed by: NURSE PRACTITIONER

## 2024-02-20 PROCEDURE — 3080F DIAST BP >= 90 MM HG: CPT | Performed by: NURSE PRACTITIONER

## 2024-02-20 PROCEDURE — 3077F SYST BP >= 140 MM HG: CPT | Performed by: NURSE PRACTITIONER

## 2024-02-20 PROCEDURE — 1036F TOBACCO NON-USER: CPT | Performed by: NURSE PRACTITIONER

## 2024-02-20 PROCEDURE — 99213 OFFICE O/P EST LOW 20 MIN: CPT | Performed by: NURSE PRACTITIONER

## 2024-02-20 ASSESSMENT — ENCOUNTER SYMPTOMS
COUGH: 1
FEVER: 1

## 2024-02-20 NOTE — PROGRESS NOTES
Subjective   Patient ID: Juvenal Bagley Jr. is a 63 y.o. male who presents for Cough.  Cough  This is a new problem. The current episode started in the past 7 days (5 days). The problem has been unchanged. The problem occurs constantly. The cough is Productive of sputum. Associated symptoms include a fever and postnasal drip. Associated symptoms comments: Head and chest congestion. Nothing aggravates the symptoms. Treatments tried: Ibuprofen. His past medical history is significant for bronchitis.    Review of Systems   Constitutional:  Positive for fever.   HENT:  Positive for postnasal drip.    Respiratory:  Positive for cough.    Objective   BP (!) 142/92   Pulse 86   Temp 36.6 °C (97.8 °F) (Temporal)   Resp 20   Wt 108 kg (239 lb)   SpO2 97%   BMI 35.29 kg/m²   Physical Exam  Vitals reviewed.   Constitutional:       Appearance: Normal appearance.   HENT:      Head: Normocephalic.      Right Ear: Tympanic membrane normal.      Left Ear: Tympanic membrane normal.      Ears:      Comments: Wears priscilla. Hearing aides. Deaf in right ear.     Nose: Nose normal. No congestion or rhinorrhea.      Mouth/Throat:      Mouth: Mucous membranes are moist.      Pharynx: No oropharyngeal exudate or posterior oropharyngeal erythema.   Eyes:      Conjunctiva/sclera: Conjunctivae normal.   Cardiovascular:      Rate and Rhythm: Normal rate and regular rhythm.      Pulses: Normal pulses.      Heart sounds: No murmur heard.  Pulmonary:      Effort: Pulmonary effort is normal.      Breath sounds: Normal breath sounds.   Abdominal:      General: Bowel sounds are normal.      Palpations: Abdomen is soft.   Musculoskeletal:         General: Normal range of motion.      Cervical back: Neck supple.   Lymphadenopathy:      Cervical: Cervical adenopathy (mild priscilla tonsillar adenitis) present.   Skin:     General: Skin is warm and dry.   Neurological:      General: No focal deficit present.      Mental Status: He is alert.   Psychiatric:          Mood and Affect: Mood normal.         Thought Content: Thought content normal.     Assessment/Plan   1. COVID-19        Continue supportive care. Symptoms are mild. No evidence of lower respiratory infection.   Increase oral fluids/water, at least eight 8-oz glasses/day.  Get plenty of rest.  Cepacol lozenges and/or Chloraseptic spray PRN for sore throat. Salt water gargle or broth for comfort.  Alternate Tylenol/Ibuprofen PRN for body aches and/or fever  Saline nasal spray PRN for rhinitis.  Guaifenessin PRN for cough  Flonase nasal spray.

## 2024-03-01 ENCOUNTER — OFFICE VISIT (OUTPATIENT)
Dept: PRIMARY CARE | Facility: CLINIC | Age: 64
End: 2024-03-01
Payer: MEDICARE

## 2024-03-01 ENCOUNTER — TELEPHONE (OUTPATIENT)
Dept: PRIMARY CARE | Facility: CLINIC | Age: 64
End: 2024-03-01
Payer: MEDICARE

## 2024-03-01 VITALS
WEIGHT: 239 LBS | SYSTOLIC BLOOD PRESSURE: 128 MMHG | RESPIRATION RATE: 18 BRPM | OXYGEN SATURATION: 98 % | BODY MASS INDEX: 35.29 KG/M2 | DIASTOLIC BLOOD PRESSURE: 74 MMHG | TEMPERATURE: 97.8 F | HEART RATE: 80 BPM

## 2024-03-01 DIAGNOSIS — M10.9 ACUTE GOUT OF RIGHT FOOT, UNSPECIFIED CAUSE: Primary | ICD-10-CM

## 2024-03-01 PROCEDURE — 99214 OFFICE O/P EST MOD 30 MIN: CPT | Performed by: NURSE PRACTITIONER

## 2024-03-01 PROCEDURE — 1036F TOBACCO NON-USER: CPT | Performed by: NURSE PRACTITIONER

## 2024-03-01 PROCEDURE — 3078F DIAST BP <80 MM HG: CPT | Performed by: NURSE PRACTITIONER

## 2024-03-01 PROCEDURE — 3074F SYST BP LT 130 MM HG: CPT | Performed by: NURSE PRACTITIONER

## 2024-03-01 PROCEDURE — 1159F MED LIST DOCD IN RCRD: CPT | Performed by: NURSE PRACTITIONER

## 2024-03-01 RX ORDER — COLCHICINE 0.6 MG/1
CAPSULE ORAL
Qty: 3 CAPSULE | Refills: 0 | Status: SHIPPED | OUTPATIENT
Start: 2024-03-01 | End: 2024-03-04 | Stop reason: SDUPTHER

## 2024-03-01 ASSESSMENT — ENCOUNTER SYMPTOMS
FATIGUE: 0
FEVER: 0

## 2024-03-01 NOTE — PROGRESS NOTES
Subjective   Patient ID: Juvenal Bagley Jr. is a 63 y.o. male who presents for Gout.    Toe Pain   The incident occurred more than 1 week ago (1 week). There was no injury mechanism. The pain is present in the right foot. The quality of the pain is described as aching. He reports no foreign bodies present. The symptoms are aggravated by weight bearing. Treatments tried: He was treated with a burst of steroids last week, which improved the symptoms. Unfortunately, symptoms returned 2 days later, but less severely.    Review of Systems   Constitutional:  Negative for fatigue and fever.   Objective   /74   Pulse 80   Temp 36.6 °C (97.8 °F) (Temporal)   Resp 18   Wt 108 kg (239 lb)   SpO2 98%   BMI 35.29 kg/m²   Physical Exam  Constitutional:       General: He is in acute distress.      Appearance: Normal appearance. He is not ill-appearing.   Neurological:      Mental Status: He is alert.     Assessment/Plan   1. Acute gout of right foot, unspecified cause  colchicine (Mitigare) 0.6 mg capsule capsule        Follow-up with PCP to discuss possible prophylaxis medication given frequency of recurrences, which he states is approx every 2 months.

## 2024-03-01 NOTE — TELEPHONE ENCOUNTER
Patient's wife called looking for support and possible script renewal for a gout flare up.    Patient was seen at Urgent Care Saturday February 24th because he was unable to walk.   He was given Prednisone 2 tablets per day for 5 days.    Concern is starting up again now that the prednisone is finished.  Patient was advised to be seen again and [facility] was suggested.

## 2024-03-02 DIAGNOSIS — M10.9 ACUTE GOUT OF RIGHT FOOT, UNSPECIFIED CAUSE: ICD-10-CM

## 2024-03-04 RX ORDER — COLCHICINE 0.6 MG/1
CAPSULE ORAL
Qty: 3 CAPSULE | Refills: 3 | Status: SHIPPED | OUTPATIENT
Start: 2024-03-04

## 2024-07-17 DIAGNOSIS — I10 HYPERTENSION, UNSPECIFIED TYPE: ICD-10-CM

## 2024-07-17 RX ORDER — LISINOPRIL 20 MG/1
20 TABLET ORAL DAILY
Qty: 90 TABLET | Refills: 1 | Status: SHIPPED | OUTPATIENT
Start: 2024-07-17 | End: 2025-01-13

## 2024-08-22 DIAGNOSIS — M25.511 ACUTE PAIN OF RIGHT SHOULDER: Primary | ICD-10-CM

## 2024-08-22 RX ORDER — METHYLPREDNISOLONE 4 MG/1
TABLET ORAL
Qty: 21 TABLET | Refills: 0 | Status: SHIPPED | OUTPATIENT
Start: 2024-08-22 | End: 2024-08-29

## 2024-08-23 ENCOUNTER — HOSPITAL ENCOUNTER (OUTPATIENT)
Dept: RADIOLOGY | Facility: CLINIC | Age: 64
Discharge: HOME | End: 2024-08-23
Payer: MEDICARE

## 2024-08-23 DIAGNOSIS — M25.511 ACUTE PAIN OF RIGHT SHOULDER: ICD-10-CM

## 2024-08-23 PROCEDURE — 72040 X-RAY EXAM NECK SPINE 2-3 VW: CPT

## 2024-08-23 PROCEDURE — 73030 X-RAY EXAM OF SHOULDER: CPT | Mod: RT

## 2024-09-01 DIAGNOSIS — M47.812 CERVICAL SPINE ARTHRITIS: Primary | ICD-10-CM

## 2024-09-01 DIAGNOSIS — G89.29 CHRONIC RIGHT SHOULDER PAIN: ICD-10-CM

## 2024-09-01 DIAGNOSIS — M25.511 CHRONIC RIGHT SHOULDER PAIN: ICD-10-CM

## 2024-09-26 ENCOUNTER — OFFICE VISIT (OUTPATIENT)
Dept: NEUROLOGY | Facility: HOSPITAL | Age: 64
End: 2024-09-26
Payer: MEDICARE

## 2024-09-26 VITALS
HEIGHT: 69 IN | DIASTOLIC BLOOD PRESSURE: 98 MMHG | RESPIRATION RATE: 18 BRPM | SYSTOLIC BLOOD PRESSURE: 157 MMHG | HEART RATE: 58 BPM | WEIGHT: 234 LBS | TEMPERATURE: 97.3 F | BODY MASS INDEX: 34.66 KG/M2

## 2024-09-26 DIAGNOSIS — G25.0 ESSENTIAL TREMOR: ICD-10-CM

## 2024-09-26 DIAGNOSIS — R20.2 ARM PARESTHESIA, RIGHT: Primary | ICD-10-CM

## 2024-09-26 PROCEDURE — 3077F SYST BP >= 140 MM HG: CPT | Performed by: NURSE PRACTITIONER

## 2024-09-26 PROCEDURE — 1159F MED LIST DOCD IN RCRD: CPT | Performed by: NURSE PRACTITIONER

## 2024-09-26 PROCEDURE — 3080F DIAST BP >= 90 MM HG: CPT | Performed by: NURSE PRACTITIONER

## 2024-09-26 PROCEDURE — 3008F BODY MASS INDEX DOCD: CPT | Performed by: NURSE PRACTITIONER

## 2024-09-26 PROCEDURE — 99213 OFFICE O/P EST LOW 20 MIN: CPT | Performed by: NURSE PRACTITIONER

## 2024-09-26 PROCEDURE — 1125F AMNT PAIN NOTED PAIN PRSNT: CPT | Performed by: NURSE PRACTITIONER

## 2024-09-26 PROCEDURE — 1160F RVW MEDS BY RX/DR IN RCRD: CPT | Performed by: NURSE PRACTITIONER

## 2024-09-26 PROCEDURE — 1036F TOBACCO NON-USER: CPT | Performed by: NURSE PRACTITIONER

## 2024-09-26 ASSESSMENT — PAIN SCALES - GENERAL: PAINLEVEL: 3

## 2024-09-26 NOTE — PROGRESS NOTES
"Subjective     Juvenal Bagley Jr. is a 64 y.o. year old male who presents with Tingling and Numbness, here for follow up visit.    HPI    Patient last seen by Dr. Torres for ET 10/23/24, continued on propranolol.     He presents today for sooner apt d/t for RUE paresthesias.     Description: \"like my arm fell asleep, pins and needles feeling, like someone squeezing my arm and shocks ontop of that\"  Onset: 2M ago without a known cause  Pain: not painful-uncomfortable/ disconcerting   Radiation: R shoulder to R wrist (denies neck involvement)  Worsened by: nothing, does not matter what he is doing, exercising or sitting reading a book, but has to stop what he is doing because he does not have good control when it feels this way (like when painting the wall)  Improved by: nothing, resolves on it it's own  Timing: lasts 15-30 seconds and then gone, comes and goes 5-6x/day, sometimes 3-4x/hr and go go most of the day without it  Associated symptoms: denies weakness. Neck pain is different. Denies weakness in his arm. Denies any paresthesias anywhere else, no BLE sx.   Hx of this before: denies, has never been evaluated for this    Neck pain he has gone a few days without it and now just low level annoyance 2/10--occurring for a while    Tremor is worsening, especially when getting dinner plate ready he is almost dumping things off of the plate, he feels like an old man. He wants to wait to discuss tremor with Dr. Torres next visit as he does not want to take too much propranolol and discuss other med options.   Bike, elliptical, body weight exercises, daily.     Denies falls or balance issues.     Meds  Propranolol 20mg 2 times a day  SE: higher doses made                 Current Outpatient Medications:     colchicine (Mitigare) 0.6 mg capsule capsule, Take 2 capsules (1.2 mg) by mouth once daily, then take the 0.6 mg dose ONE HOUR after the initial dose. Do not repeat this cycle for at least 72 hours., Disp: 3 " "capsule, Rfl: 3    glucosamine/chondr quach A sod (OSTEO BI-FLEX ORAL), Take 1 tablet by mouth in the morning and 1 tablet before bedtime., Disp: , Rfl:     lisinopril 20 mg tablet, Take 1 tablet (20 mg) by mouth once daily., Disp: 90 tablet, Rfl: 1    multivitamin tablet, Take 1 tablet by mouth once daily., Disp: , Rfl:     propranolol (Inderal) 20 mg tablet, Take 1 tablet (20 mg) by mouth 2 times a day., Disp: 180 tablet, Rfl: 3       Objective   Vitals:    09/26/24 1133   BP: (!) 157/98   Pulse: 58   Resp: 18   Temp: 36.3 °C (97.3 °F)   Weight: 106 kg (234 lb)   Height: 1.753 m (5' 9\")           Physical Exam  General:  Well developed well-nourished male in no acute distress with good grooming.  Mental Status:  The patient is awake, alert and oriented to time, place and person.  Grossly normal fund of knowledge noted.  Recent and remote memory intact.  Attention span and concentration is within normal limits.   Language:  Speech is clear, language is intact.   Cranial Nerves:  Pupils are equal, round, reactive to light. Extraocular muscles are intact.  .  Cranial nerve 5 and 7 are symmetric bilaterally.  Hearing is intact to voice on the L side only (chronic R hearing loss). Palate elevates symmetrically.  Tongue is midline.  Shoulder shrug is intact.   Motor Exam:  Shows symmetric strength in the upper and lower extremities bilaterally both proximally and distally with normal tone and bulk. Muscle strength 5/5 throughout.   Tremor: BUE kinetic tremor, mild RUE intermittent resting tremor 1+  Sensory Exam: Is intact in BUE to pin prick, light touch, temperature  Deep Tendon Reflexes: RUE absent, LUE-1+ bilateral patellar 2+, bilateral ankle 2+  Cerebellar Exam:  Shows no dysmetria or truncal ataxia.  Gait and Station:  Is within normal limits, slightly stooped posture.     Assessment/Plan   Mr. Juvenal Bagley Jr. Is a 64 y.o. M followed by our clinic for ET but here for sooner apt today to discuss RUE paresthesias. " This started 2M ago, no provoking incident, has baseline mild neck pain but these paresthesias he describes like RUE is falling asleep occurs on and off without warning, nothing makes it better/worse, lasting about 15-30 seconds about 5-6x/day. He denies weakness in the arms or legs, no other paresthesias. On exam strength is intact, sensation intact (except did not test vibration) and absent RUE reflexes. Since neuro exam is unremarkable (other than known tremor, RUE absent reflexes) and strength is intact, no hyperreflexia, will start by checking EMG and considering imaging based on that.   ET is worsening/bothersome but he prefers to wait and discuss addition of primidone w/ Dr. Torres at apt next month.       Diagnoses and all orders for this visit:  Arm paresthesia, right  -     EMG & nerve conduction; Future  Essential tremor      #EMG for RUE paresthesias    #Agree with physical therapy    #Follow up as scheduled with Dr. Melissa JOHNSON, AWILDA Gray, personally performed  a medically appropriate exam, discussion of care/treatment options taking a total time of 21 minutes for today's visit.    Robin Gray NP-C  Adult/Gerontological Nurse Practitioner   [facility], Department of Neurology  [facility] [place]  [facility]  [address] [place] [address]  Phone: [phone]  Fax: [phone]

## 2024-09-26 NOTE — PATIENT INSTRUCTIONS
#EMG for RUE paresthesias    #Agree with physical therapy    #Follow up as scheduled with Dr. Torres

## 2024-10-02 ENCOUNTER — EVALUATION (OUTPATIENT)
Dept: PHYSICAL THERAPY | Facility: CLINIC | Age: 64
End: 2024-10-02
Payer: MEDICARE

## 2024-10-02 DIAGNOSIS — R20.2 TINGLING OF RIGHT UPPER EXTREMITY: ICD-10-CM

## 2024-10-02 DIAGNOSIS — G89.29 CHRONIC RIGHT SHOULDER PAIN: ICD-10-CM

## 2024-10-02 DIAGNOSIS — M25.511 CHRONIC RIGHT SHOULDER PAIN: ICD-10-CM

## 2024-10-02 DIAGNOSIS — M54.2 NECK PAIN: Primary | ICD-10-CM

## 2024-10-02 PROBLEM — M25.519 SHOULDER PAIN: Status: RESOLVED | Noted: 2024-10-02 | Resolved: 2024-10-02

## 2024-10-02 PROBLEM — M25.519 SHOULDER PAIN: Status: ACTIVE | Noted: 2024-10-02

## 2024-10-02 PROCEDURE — 97161 PT EVAL LOW COMPLEX 20 MIN: CPT | Mod: GP | Performed by: PHYSICAL THERAPIST

## 2024-10-02 PROCEDURE — 97140 MANUAL THERAPY 1/> REGIONS: CPT | Mod: GP | Performed by: PHYSICAL THERAPIST

## 2024-10-02 PROCEDURE — 97110 THERAPEUTIC EXERCISES: CPT | Mod: GP | Performed by: PHYSICAL THERAPIST

## 2024-10-02 ASSESSMENT — PATIENT HEALTH QUESTIONNAIRE - PHQ9
2. FEELING DOWN, DEPRESSED OR HOPELESS: NOT AT ALL
SUM OF ALL RESPONSES TO PHQ9 QUESTIONS 1 AND 2: 0
1. LITTLE INTEREST OR PLEASURE IN DOING THINGS: NOT AT ALL

## 2024-10-02 ASSESSMENT — ENCOUNTER SYMPTOMS
OCCASIONAL FEELINGS OF UNSTEADINESS: 0
DEPRESSION: 0
LOSS OF SENSATION IN FEET: 0

## 2024-10-02 ASSESSMENT — PAIN SCALES - GENERAL: PAINLEVEL_OUTOF10: 5 - MODERATE PAIN

## 2024-10-02 ASSESSMENT — PAIN - FUNCTIONAL ASSESSMENT: PAIN_FUNCTIONAL_ASSESSMENT: 0-10

## 2024-10-02 NOTE — PROGRESS NOTES
Physical Therapy Evaluation and Treatment      Patient Name: Juvenal Bagley Jr.  MRN: [id number]  Today's Date: 10/2/2024  Visit #1  Time Calculation  Start Time: 0925  Stop Time: 1010  Time Calculation (min): 45 min    Insurance:  Visit Limit: Med nec  Date Range: 1/2/2025  Co-Pay: None    Assessment:  Patient is presenting today with pain neck and right shoulder as well as intermittent tingling in RUE. Examination findings are consistent with cervical radiculopathy. Patient will benefit from physical therapy services to improve listed impairments. Initiated treatment today to address these impairments.    Patient with the following impairments: decreased muscle performance, decreased ROM, decreased activity tolerance, pain, and participation restrictions    Patient's response to session: No change in pain, Increased ROM/joint mobility, Increase motor control, and Increased knowledge and understanding    Plan:  Treatment/Interventions: Biofeedback, Canalith repositioning, Cryotherapy, Dry needling, Education/ Instruction, Electrical stimulation, Hot pack, Manual therapy, Neuromuscular re-education, Self care/ home management, Taping techniques, Therapeutic activities, Therapeutic exercises  PT Plan: Skilled PT  PT Frequency: 1 time per week  Duration: 12 weeks  Onset Date: 09/01/24  Certification Period Start Date: 10/02/24  Certification Period End Date: 12/31/24  Rehab Potential: Good  Plan of Care Agreement: Patient    Current Problem:   1. Neck pain  Follow Up In Physical Therapy      2. Chronic right shoulder pain  Referral to Physical Therapy    Follow Up In Physical Therapy      3. Tingling of right upper extremity  Follow Up In Physical Therapy          Subjective   Patient presenting today with pain in neck and R shoulder as well as tingling in RUE. This started over the summer without mechanism of injury. He reports there is nothing to which he can attribute the pain and tingling. NSAIDS help. Patient  denies falls. Patient wishes to reduce pain and tingling.    Pain Assessment: 0-10  0-10 (Numeric) Pain Score: 5 - Moderate pain    General  Referred By: JOSE Rosario    Precautions  STEADI Fall Risk Score (The score of 4 or more indicates an increased risk of falling): 0  Precautions Comment: None    Objective   Cervical AROM  Flexion: No loss  Extension: Mod loss  Rotation L/R: Min loss/No loss  Side Bending L/R: Min loss/No loss     Shoulder AROM (L/R)  Flexion: 170°/170°  Abduction: 170°/170°  Extension: 60°/60°  External Rotation: 90°/90°  Internal rotation: 70°/70°    Cervical Myotomes (L/R)  C4 Scapular elevation: 5/5, 5/5  C5 Shoulder abduction: 5/5, 5/5  C6 Elbow flexion: 5/5, 5/5  C7 Elbow extension: 5/5, 5/5    UE Dermatomes: intact B except C5 on R    DTR (L/R)  Biceps C5: 2+/1+  Brachioradialis C6: 2+/0    Longus coli testing (Male normal 38.9 sec, Female 29.4 sec): 15 sec    Joint mobility testing (normal unless otherwise noted below)  C0-1:  C1-3:  C3-6:   C7-T5: hypo    Shoulder MMT (L/R)  Upper trapezius: 4/5, 4/5  Middle trapezius: 4/5, 4/5  Lower trapezius: 4/5, 4/5  Serratus anterior: 4/5, 4/5  Supraspinatus: 5/5, 5/5  Infraspinatus: 5/5, 5/5  Teres Minor: 5/5, 5/5  Subscapularis: 5/5, 5/5    Shoulder Special Tests (L/R)    Painful Arc: Negative/Negative  Infraspinatus MMT: Negative/Negative  Drop Arm Test: Negative/Negative  Lateral Prema: Negative/Negative  Shrug Sign: Negative/Negative  Lift-Off: Negative/Negative  Neer: Negative/Negative  Lopez-Armond: Negative/ Negative  Apprehension/Surprise Test: Negative/Negative  Relocation Test: Negative/Negative  Load-Shift: Negative/Negative    Outcome Measures:  Other Measures  Neck Disability Index: 8     Treatments:  Therapeutic Exercise (41338): 19 minutes  HEP review  Supine chin tucks*  Cervical rotation snag*  T/s ext over chair    Manual Therapy (50594): 5 minutes  CTJ side glide B  T1-3 ext nags  Seated CTJ  distraction    Education and discussion on HEP and treatment regarding the benefits related to current condition, POC, pathophysiology, and precautions    *added to HEP    Goals:  Patient will improve Neck Disability Index score to meet minimal detectable change of improvement to improve performance of ADLs.    Patient will be independent with home exercise program for proper self-management of condition.    Patient will improve active range of motion in deficit areas for ADL completion.    Patient will improve strength in deficit areas so patient can work with less pain.    Patient will exercise without pain.

## 2024-10-09 ENCOUNTER — HOSPITAL ENCOUNTER (OUTPATIENT)
Dept: NEUROLOGY | Facility: CLINIC | Age: 64
Discharge: HOME | End: 2024-10-09
Payer: MEDICARE

## 2024-10-09 DIAGNOSIS — R20.2 ARM PARESTHESIA, RIGHT: ICD-10-CM

## 2024-10-09 PROCEDURE — 95910 NRV CNDJ TEST 7-8 STUDIES: CPT | Performed by: PSYCHIATRY & NEUROLOGY

## 2024-10-09 PROCEDURE — 95886 MUSC TEST DONE W/N TEST COMP: CPT | Performed by: PSYCHIATRY & NEUROLOGY

## 2024-10-10 DIAGNOSIS — M54.12 CERVICAL RADICULOPATHY: Primary | ICD-10-CM

## 2024-10-15 ENCOUNTER — LAB (OUTPATIENT)
Dept: LAB | Facility: LAB | Age: 64
End: 2024-10-15
Payer: COMMERCIAL

## 2024-10-15 ENCOUNTER — APPOINTMENT (OUTPATIENT)
Dept: PRIMARY CARE | Facility: CLINIC | Age: 64
End: 2024-10-15
Payer: MEDICARE

## 2024-10-15 VITALS
WEIGHT: 236 LBS | BODY MASS INDEX: 34.96 KG/M2 | HEART RATE: 65 BPM | SYSTOLIC BLOOD PRESSURE: 148 MMHG | HEIGHT: 69 IN | RESPIRATION RATE: 18 BRPM | DIASTOLIC BLOOD PRESSURE: 90 MMHG

## 2024-10-15 DIAGNOSIS — Z12.11 SCREENING FOR COLORECTAL CANCER: ICD-10-CM

## 2024-10-15 DIAGNOSIS — I10 HYPERTENSION, UNSPECIFIED TYPE: ICD-10-CM

## 2024-10-15 DIAGNOSIS — Z13.220 LIPID SCREENING: ICD-10-CM

## 2024-10-15 DIAGNOSIS — G25.0 ESSENTIAL TREMOR: ICD-10-CM

## 2024-10-15 DIAGNOSIS — R17 SERUM TOTAL BILIRUBIN ELEVATED: Primary | ICD-10-CM

## 2024-10-15 DIAGNOSIS — Z00.00 ROUTINE GENERAL MEDICAL EXAMINATION AT HEALTH CARE FACILITY: Primary | ICD-10-CM

## 2024-10-15 DIAGNOSIS — Z00.00 ROUTINE GENERAL MEDICAL EXAMINATION AT HEALTH CARE FACILITY: ICD-10-CM

## 2024-10-15 DIAGNOSIS — R53.83 OTHER FATIGUE: ICD-10-CM

## 2024-10-15 DIAGNOSIS — E55.9 VITAMIN D DEFICIENCY: ICD-10-CM

## 2024-10-15 DIAGNOSIS — Z23 NEED FOR VACCINATION: ICD-10-CM

## 2024-10-15 DIAGNOSIS — Z12.12 SCREENING FOR COLORECTAL CANCER: ICD-10-CM

## 2024-10-15 DIAGNOSIS — R17 ELEVATED BILIRUBIN: ICD-10-CM

## 2024-10-15 DIAGNOSIS — I77.819 AORTIC ECTASIA: ICD-10-CM

## 2024-10-15 PROBLEM — G47.52 REM BEHAVIORAL DISORDER: Status: RESOLVED | Noted: 2023-03-29 | Resolved: 2024-10-15

## 2024-10-15 PROBLEM — M17.12 ARTHRITIS OF KNEE, LEFT: Status: RESOLVED | Noted: 2023-03-29 | Resolved: 2024-10-15

## 2024-10-15 PROBLEM — G47.61 PERIODIC LIMB MOVEMENT DISORDER (PLMD): Status: RESOLVED | Noted: 2023-03-29 | Resolved: 2024-10-15

## 2024-10-15 PROBLEM — U07.1 COVID-19: Status: RESOLVED | Noted: 2024-02-20 | Resolved: 2024-10-15

## 2024-10-15 LAB
25(OH)D3 SERPL-MCNC: 68 NG/ML (ref 30–100)
ALBUMIN SERPL BCP-MCNC: 4.9 G/DL (ref 3.4–5)
ALP SERPL-CCNC: 79 U/L (ref 33–136)
ALT SERPL W P-5'-P-CCNC: 20 U/L (ref 10–52)
ANION GAP SERPL CALC-SCNC: 16 MMOL/L (ref 10–20)
AST SERPL W P-5'-P-CCNC: 21 U/L (ref 9–39)
BASOPHILS # BLD AUTO: 0.07 X10*3/UL (ref 0–0.1)
BASOPHILS NFR BLD AUTO: 0.8 %
BILIRUB SERPL-MCNC: 2 MG/DL (ref 0–1.2)
BUN SERPL-MCNC: 15 MG/DL (ref 6–23)
CALCIUM SERPL-MCNC: 10 MG/DL (ref 8.6–10.6)
CHLORIDE SERPL-SCNC: 103 MMOL/L (ref 98–107)
CHOLEST SERPL-MCNC: 172 MG/DL (ref 0–199)
CHOLESTEROL/HDL RATIO: 4.6
CO2 SERPL-SCNC: 27 MMOL/L (ref 21–32)
CREAT SERPL-MCNC: 0.92 MG/DL (ref 0.5–1.3)
EGFRCR SERPLBLD CKD-EPI 2021: >90 ML/MIN/1.73M*2
EOSINOPHIL # BLD AUTO: 0.45 X10*3/UL (ref 0–0.7)
EOSINOPHIL NFR BLD AUTO: 5.4 %
ERYTHROCYTE [DISTWIDTH] IN BLOOD BY AUTOMATED COUNT: 13.1 % (ref 11.5–14.5)
GLUCOSE SERPL-MCNC: 83 MG/DL (ref 74–99)
HCT VFR BLD AUTO: 49.1 % (ref 41–52)
HCV AB SER QL: NONREACTIVE
HDLC SERPL-MCNC: 37.4 MG/DL
HGB BLD-MCNC: 16.1 G/DL (ref 13.5–17.5)
IMM GRANULOCYTES # BLD AUTO: 0.06 X10*3/UL (ref 0–0.7)
IMM GRANULOCYTES NFR BLD AUTO: 0.7 % (ref 0–0.9)
LDLC SERPL CALC-MCNC: 106 MG/DL
LYMPHOCYTES # BLD AUTO: 1.85 X10*3/UL (ref 1.2–4.8)
LYMPHOCYTES NFR BLD AUTO: 22.4 %
MCH RBC QN AUTO: 32.1 PG (ref 26–34)
MCHC RBC AUTO-ENTMCNC: 32.8 G/DL (ref 32–36)
MCV RBC AUTO: 98 FL (ref 80–100)
MONOCYTES # BLD AUTO: 1.13 X10*3/UL (ref 0.1–1)
MONOCYTES NFR BLD AUTO: 13.7 %
NEUTROPHILS # BLD AUTO: 4.7 X10*3/UL (ref 1.2–7.7)
NEUTROPHILS NFR BLD AUTO: 57 %
NON HDL CHOLESTEROL: 135 MG/DL (ref 0–149)
NRBC BLD-RTO: 0 /100 WBCS (ref 0–0)
PLATELET # BLD AUTO: 173 X10*3/UL (ref 150–450)
POTASSIUM SERPL-SCNC: 4.1 MMOL/L (ref 3.5–5.3)
PROT SERPL-MCNC: 7.4 G/DL (ref 6.4–8.2)
RBC # BLD AUTO: 5.02 X10*6/UL (ref 4.5–5.9)
SODIUM SERPL-SCNC: 142 MMOL/L (ref 136–145)
TRIGL SERPL-MCNC: 141 MG/DL (ref 0–149)
TSH SERPL-ACNC: 2.24 MIU/L (ref 0.44–3.98)
VLDL: 28 MG/DL (ref 0–40)
WBC # BLD AUTO: 8.3 X10*3/UL (ref 4.4–11.3)

## 2024-10-15 PROCEDURE — 36415 COLL VENOUS BLD VENIPUNCTURE: CPT

## 2024-10-15 PROCEDURE — 82306 VITAMIN D 25 HYDROXY: CPT

## 2024-10-15 PROCEDURE — 80061 LIPID PANEL: CPT

## 2024-10-15 PROCEDURE — 84443 ASSAY THYROID STIM HORMONE: CPT

## 2024-10-15 PROCEDURE — 86803 HEPATITIS C AB TEST: CPT

## 2024-10-15 PROCEDURE — 85025 COMPLETE CBC W/AUTO DIFF WBC: CPT

## 2024-10-15 PROCEDURE — 80053 COMPREHEN METABOLIC PANEL: CPT

## 2024-10-15 RX ORDER — GLUCOSAMINE/CHONDR SU A SOD 250-200 MG
1 TABLET ORAL 2 TIMES DAILY
Qty: 180 TABLET | Refills: 3 | Status: CANCELLED | OUTPATIENT
Start: 2024-10-15

## 2024-10-15 RX ORDER — PROPRANOLOL HYDROCHLORIDE 20 MG/1
20 TABLET ORAL 2 TIMES DAILY
Qty: 180 TABLET | Refills: 3 | Status: CANCELLED | OUTPATIENT
Start: 2024-10-15 | End: 2025-10-15

## 2024-10-15 RX ORDER — LISINOPRIL 20 MG/1
20 TABLET ORAL DAILY
Qty: 90 TABLET | Refills: 3 | Status: SHIPPED | OUTPATIENT
Start: 2024-10-15 | End: 2025-10-10

## 2024-10-15 ASSESSMENT — PATIENT HEALTH QUESTIONNAIRE - PHQ9
SUM OF ALL RESPONSES TO PHQ9 QUESTIONS 1 AND 2: 0
1. LITTLE INTEREST OR PLEASURE IN DOING THINGS: NOT AT ALL
2. FEELING DOWN, DEPRESSED OR HOPELESS: NOT AT ALL

## 2024-10-15 ASSESSMENT — ACTIVITIES OF DAILY LIVING (ADL)
MANAGING_FINANCES: INDEPENDENT
DRESSING: INDEPENDENT
TAKING_MEDICATION: INDEPENDENT
DOING_HOUSEWORK: INDEPENDENT
BATHING: INDEPENDENT
GROCERY_SHOPPING: INDEPENDENT

## 2024-10-15 ASSESSMENT — ENCOUNTER SYMPTOMS
EYES NEGATIVE: 1
PSYCHIATRIC NEGATIVE: 1
CONSTITUTIONAL NEGATIVE: 1
LOSS OF SENSATION IN FEET: 0
ALLERGIC/IMMUNOLOGIC NEGATIVE: 1
NECK PAIN: 1
DEPRESSION: 0
RESPIRATORY NEGATIVE: 1
OCCASIONAL FEELINGS OF UNSTEADINESS: 0
CARDIOVASCULAR NEGATIVE: 1
HEMATOLOGIC/LYMPHATIC NEGATIVE: 1
NEUROLOGICAL NEGATIVE: 1
GASTROINTESTINAL NEGATIVE: 1

## 2024-10-15 NOTE — PROGRESS NOTES
Subjective   Patient ID: Juvenal Bagley Jr. is a 64 y.o. male who presents for Medicare Annual Wellness Visit Subsequent (Pt states he is getting over a cold that started Friday evening.).  Subjective    HPI   Concerns: Due to neck pain, and tingling in right  shoulder down to right wrist. Had an EMG done on 10/9 showed carpal tunnel done and is being told to do an MRI, but he is hesitant because of claustrophobia. Still having cervical radiculopathy to right side.   EMG   Has been doing PT exercise  even at home once daily, and is noticing improvement.  Does exercises 7 days a week anyway- 4 days a week elliptical and 3 days a week bicycle, as well as strength training.    Diet- drinks iced tea, drinks some tart cherry juice (helps with gout) and lots of water. Gets fresh/steamed veggies and protein intake.    Pt is very active- and Has a part time job at a [employer]    Subjective   Reason for Visit: Juvenal Bagley Jr. is an 64 y.o. male here for a Medicare Wellness visit.     Past Medical, Surgical, and Family History reviewed and updated in chart.    Reviewed all medications by prescribing practitioner or clinical pharmacist (such as prescriptions, OTCs, herbal therapies and supplements) and documented in the medical record.    Patient saw neurology for radiculopathy. They ordered an MRI but he is hesitant because he doesn't like small spaces, and the discomfort is improving with PT.    Do you take any herbs or supplements that were not prescribed by a doctor? no  Are you taking calcium supplements? no  Are you taking aspirin daily? no      History:  Patient receives prostate care outside our clinic @ [facility]    Do you have pain that bothers you in your daily life? no    Review of Systems   Constitutional: Negative.    HENT:  Positive for tinnitus.         Ringing in his ears, does wear hearing aids, profound hearing loss in right ear    Eyes: Negative.         Being treated for Glaucoma to reduce pressure-  "no eye drops , wears contacts   Respiratory: Negative.     Cardiovascular: Negative.    Gastrointestinal: Negative.    Genitourinary: Negative.         Sees urologist- sees then annually   Musculoskeletal:  Positive for neck pain.        Cervical radiculopathy for months-being seen for PT   Skin: Negative.         Has not been to dermatology for skin checks in awhile   Allergic/Immunologic: Negative.    Neurological: Negative.    Hematological: Negative.    Psychiatric/Behavioral: Negative.         Objective   /90   Pulse 65   Resp 18   Ht 1.753 m (5' 9\")   Wt 107 kg (236 lb)   BMI 34.85 kg/m²     Physical Exam  Constitutional:       General: He is not in acute distress.     Appearance: He is normal weight. He is not ill-appearing.   HENT:      Head: Normocephalic and atraumatic.      Right Ear: Tympanic membrane, ear canal and external ear normal.      Left Ear: Tympanic membrane, ear canal and external ear normal.      Nose: Nose normal.      Mouth/Throat:      Mouth: Mucous membranes are moist.      Pharynx: Oropharynx is clear.   Eyes:      Conjunctiva/sclera: Conjunctivae normal.      Pupils: Pupils are equal, round, and reactive to light.   Cardiovascular:      Rate and Rhythm: Normal rate and regular rhythm.      Pulses: Normal pulses.      Heart sounds: Normal heart sounds.   Pulmonary:      Effort: Pulmonary effort is normal. No respiratory distress.      Breath sounds: Normal breath sounds.   Abdominal:      General: Bowel sounds are normal.      Palpations: Abdomen is soft.      Tenderness: There is no abdominal tenderness.   Musculoskeletal:         General: Normal range of motion.   Skin:     General: Skin is warm and dry.   Neurological:      General: No focal deficit present.      Mental Status: He is alert and oriented to person, place, and time.   Psychiatric:         Mood and Affect: Mood normal.         Behavior: Behavior normal.         Thought Content: Thought content normal.         " Judgment: Judgment normal.         Assessment/Plan   Problem List Items Addressed This Visit       Elevated bilirubin    Fatigue    Relevant Orders    Tsh With Reflex To Free T4 If Abnormal     Other Visit Diagnoses       Routine general medical examination at health care facility    -  Primary    Relevant Orders    1 Year Follow Up In [facility] - PCP - Wellness Exam    Hepatitis C antibody    Hypertension, unspecified type        Relevant Medications    lisinopril 20 mg tablet    Other Relevant Orders    Comprehensive metabolic panel    CBC and Auto Differential    1 Year Follow Up In [facility] - PCP - Wellness Exam    Essential tremor        Lipid screening        Relevant Orders    Lipid panel    Vitamin D deficiency        Relevant Orders    Vitamin D 25-Hydroxy,Total (for eval of Vitamin D levels)    Screening for colorectal cancer        Relevant Orders    Cologuard®    Need for vaccination        Relevant Orders    Flu vaccine, high dose (Completed)    Pneumococcal vaccine 20-valent (Completed)          Patient Instructions   Patient to continue medications as ordered. Have fasting labs and cologuard done, and we will call with results when available. Follow-up in 1 year, or sooner if needed. Call the office if any problems or concerns in the meantime.

## 2024-10-15 NOTE — PROGRESS NOTES
"Subjective   Reason for Visit: Juvenal Bagley Jr. is an 64 y.o. male here for a Medicare Wellness visit.     Past Medical, Surgical, and Family History reviewed and updated in chart.    Reviewed all medications by prescribing practitioner or clinical pharmacist (such as prescriptions, OTCs, herbal therapies and supplements) and documented in the medical record.    Patient saw neurology for radiculopathy. They ordered an MRI but he is hesitant because he doesn't like small spaces, and the discomfort is improving with PT.    Subjective  Juvenal Bagley Jr. is a 64 y.o. male and is here for a comprehensive physical exam. The patient reports no problems.    Do you take any herbs or supplements that were not prescribed by a doctor? no  Are you taking calcium supplements? no  Are you taking aspirin daily? no      History:  Patient receives prostate care outside our clinic @ [facility]    Do you have pain that bothers you in your daily life? no      Patient Care Team:  JOSE Fishman as PCP - General (Internal Medicine)  Tracy M Schwab, APRN-CNP as PCP - [facility]ributed [facility]     Review of Systems    Objective   Vitals:  /90   Pulse 65   Resp 18   Ht 1.753 m (5' 9\")   Wt 107 kg (236 lb)   BMI 34.85 kg/m²       Physical Exam  Constitutional:       General: He is not in acute distress.     Appearance: He is not ill-appearing.   HENT:      Head: Normocephalic and atraumatic.      Right Ear: Tympanic membrane, ear canal and external ear normal.      Left Ear: Tympanic membrane, ear canal and external ear normal.      Nose: Nose normal.      Mouth/Throat:      Mouth: Mucous membranes are moist.      Pharynx: Oropharynx is clear.   Eyes:      Conjunctiva/sclera: Conjunctivae normal.      Pupils: Pupils are equal, round, and reactive to light.   Cardiovascular:      Rate and Rhythm: Normal rate and regular rhythm.      Pulses: Normal pulses.      Heart sounds: Normal heart sounds.   Pulmonary:      " Effort: Pulmonary effort is normal. No respiratory distress.      Breath sounds: Normal breath sounds.   Abdominal:      General: Bowel sounds are normal.      Palpations: Abdomen is soft.      Tenderness: There is no abdominal tenderness.   Musculoskeletal:         General: Normal range of motion.   Skin:     General: Skin is warm and dry.   Neurological:      General: No focal deficit present.      Mental Status: He is alert and oriented to person, place, and time.   Psychiatric:         Mood and Affect: Mood normal.         Behavior: Behavior normal.         Thought Content: Thought content normal.         Judgment: Judgment normal.       Assessment & Plan  Routine general medical examination at health care facility    Orders:    1 Year Follow Up In [facility] - PCP - Wellness Exam; Future    Hepatitis C antibody; Future    Hypertension, unspecified type    Orders:    lisinopril 20 mg tablet; Take 1 tablet (20 mg) by mouth once daily.    Comprehensive metabolic panel; Future    CBC and Auto Differential; Future    1 Year Follow Up In [facility] - PCP - Wellness Exam; Future    Essential tremor         Elevated bilirubin         Lipid screening    Orders:    Lipid panel; Future    Vitamin D deficiency    Orders:    Vitamin D 25-Hydroxy,Total (for eval of Vitamin D levels); Future    Other fatigue    Orders:    Tsh With Reflex To Free T4 If Abnormal; Future    Screening for colorectal cancer    Orders:    Cologuard®; Future    Cologuard®    Need for vaccination    Orders:    Flu vaccine, high dose    Pneumococcal vaccine 20-valent         {AWV Counseling (Optional):60718}

## 2024-10-15 NOTE — PATIENT INSTRUCTIONS
Patient to continue medications as ordered. Have fasting labs and cologuard done, and we will call with results when available. Follow-up in 1 year, or sooner if needed. Call the office if any problems or concerns in the meantime.

## 2024-10-15 NOTE — PROGRESS NOTES
"Subjective   Reason for Visit: Juvenal Bagley Jr. is an 64 y.o. male here for a Medicare Wellness visit.          Reviewed all medications by prescribing practitioner or clinical pharmacist (such as prescriptions, OTCs, herbal therapies and supplements) and documented in the medical record.    No POA and no living will.      Patient Care Team:  JOSE Fishman as PCP - General (Internal Medicine)  Tracy M Schwab, APRN-CNP as PCP - [facility] Attributed Provider     Review of Systems    Objective   Vitals:  /84   Pulse 65   Resp 18   Ht 1.753 m (5' 9\")   Wt 107 kg (236 lb)   BMI 34.85 kg/m²       Physical Exam    Assessment & Plan  Hypertension, unspecified type         Essential tremor                   "

## 2024-10-28 ENCOUNTER — APPOINTMENT (OUTPATIENT)
Dept: NEUROLOGY | Facility: CLINIC | Age: 64
End: 2024-10-28
Payer: MEDICARE

## 2024-10-28 VITALS
SYSTOLIC BLOOD PRESSURE: 163 MMHG | HEART RATE: 96 BPM | WEIGHT: 236 LBS | BODY MASS INDEX: 34.96 KG/M2 | HEIGHT: 69 IN | DIASTOLIC BLOOD PRESSURE: 88 MMHG

## 2024-10-28 DIAGNOSIS — R20.2 ARM PARESTHESIA, RIGHT: ICD-10-CM

## 2024-10-28 DIAGNOSIS — G25.0 ESSENTIAL TREMOR: Primary | ICD-10-CM

## 2024-10-28 PROCEDURE — 1123F ACP DISCUSS/DSCN MKR DOCD: CPT | Performed by: PSYCHIATRY & NEUROLOGY

## 2024-10-28 PROCEDURE — G2211 COMPLEX E/M VISIT ADD ON: HCPCS | Performed by: PSYCHIATRY & NEUROLOGY

## 2024-10-28 PROCEDURE — 1159F MED LIST DOCD IN RCRD: CPT | Performed by: PSYCHIATRY & NEUROLOGY

## 2024-10-28 PROCEDURE — 3077F SYST BP >= 140 MM HG: CPT | Performed by: PSYCHIATRY & NEUROLOGY

## 2024-10-28 PROCEDURE — 1036F TOBACCO NON-USER: CPT | Performed by: PSYCHIATRY & NEUROLOGY

## 2024-10-28 PROCEDURE — 1160F RVW MEDS BY RX/DR IN RCRD: CPT | Performed by: PSYCHIATRY & NEUROLOGY

## 2024-10-28 PROCEDURE — 3008F BODY MASS INDEX DOCD: CPT | Performed by: PSYCHIATRY & NEUROLOGY

## 2024-10-28 PROCEDURE — 3079F DIAST BP 80-89 MM HG: CPT | Performed by: PSYCHIATRY & NEUROLOGY

## 2024-10-28 PROCEDURE — 99215 OFFICE O/P EST HI 40 MIN: CPT | Performed by: PSYCHIATRY & NEUROLOGY

## 2024-10-28 RX ORDER — PRIMIDONE 50 MG/1
TABLET ORAL
Qty: 60 TABLET | Refills: 11 | Status: SHIPPED | OUTPATIENT
Start: 2024-10-28 | End: 2024-12-18

## 2024-10-28 ASSESSMENT — FAHN TOLOSA MARTIN TREMOR (FTM)
DRAWING A LEFT SCORE: 3
TOUNGE WHEN PROTUDED: 0
RUE TOTAL SCORE: 1
FACE IN REPOSE: 0
DRAWING A TOTAL SCORE: 3
HEAD IN REPOSE: 0
HEAD TOTAL SCORE: 0
FACE TOTAL: 0
DRAWING C LEFT SCORE: 1
POURING SCORE: 0
HEAD AT POSTURE WHEN STANDING OR SITTING: 0
UE ARM AT REST: 1
UE ARMS OUTSTRECHED WRISTS MILDLY EXTENDED FINGERS SPREAD APART: 1
UE ARMS OUTSTRECHED WRISTS MILDLY EXTENDED FINGERS SPREAD APART: 0
LE TOE TO FINGER IN A FLEXED POSTURE: 0
HANDWRITING WITH DOMINANT HAND: 1
LUE TOTAL SCORE: 3
DRAWING A RIGHT SCORE: 0
FACE AT POSTURE WHEN STANDING OR SITTING: 0
UE FINGER TO NOSE AND OTHER ACTIONS: 1
PART B SUBTOTAL SCORE: 9
TOUNGE TOTAL SCORE: 0
TOUNGE AT REST: 0
DRAWING B TOTAL SCORE: 4
UE ARM AT REST: 0
DRAWING C RIGHT SCORE: 0
LE TOE TO FINGER IN A FLEXED POSTURE: 0
DRAWING C TOTAL SCORE: 1
DRAWING B RGHT SCORE: 1
UE FINGER TO NOSE AND OTHER ACTIONS: 1
LLE TOTAL SCORE: 0
DRAWING B LEFT SCORE: 3
VOICE TOTAL SCORE: 2
LE LEG FLEXED AT HIPS AND KNEES AT POSTURE: 0
LE LEG FLEXED AT HIPS AND KNEES AT POSTURE: 0
VOICE IN ACTION: 2
LE AT REST: 0

## 2024-10-28 ASSESSMENT — ANXIETY QUESTIONNAIRES
1. FEELING NERVOUS, ANXIOUS, OR ON EDGE: NOT AT ALL
IF YOU CHECKED OFF ANY PROBLEMS ON THIS QUESTIONNAIRE, HOW DIFFICULT HAVE THESE PROBLEMS MADE IT FOR YOU TO DO YOUR WORK, TAKE CARE OF THINGS AT HOME, OR GET ALONG WITH OTHER PEOPLE: NOT DIFFICULT AT ALL
5. BEING SO RESTLESS THAT IT IS HARD TO SIT STILL: NOT AT ALL
3. WORRYING TOO MUCH ABOUT DIFFERENT THINGS: NOT AT ALL
4. TROUBLE RELAXING: NOT AT ALL
2. NOT BEING ABLE TO STOP OR CONTROL WORRYING: NOT AT ALL
7. FEELING AFRAID AS IF SOMETHING AWFUL MIGHT HAPPEN: NOT AT ALL
6. BECOMING EASILY ANNOYED OR IRRITABLE: NOT AT ALL
GAD7 TOTAL SCORE: 0

## 2024-10-28 ASSESSMENT — ENCOUNTER SYMPTOMS
TREMORS: 1
LOSS OF SENSATION IN FEET: 0
DEPRESSION: 0
OCCASIONAL FEELINGS OF UNSTEADINESS: 0

## 2024-10-29 LAB — NONINV COLON CA DNA+OCC BLD SCRN STL QL: NEGATIVE

## 2024-10-30 ENCOUNTER — TREATMENT (OUTPATIENT)
Dept: PHYSICAL THERAPY | Facility: CLINIC | Age: 64
End: 2024-10-30
Payer: MEDICARE

## 2024-10-30 DIAGNOSIS — M25.511 RIGHT SHOULDER PAIN, UNSPECIFIED CHRONICITY: ICD-10-CM

## 2024-10-30 DIAGNOSIS — M54.2 NECK PAIN: Primary | ICD-10-CM

## 2024-10-30 DIAGNOSIS — R20.2 TINGLING OF RIGHT UPPER EXTREMITY: ICD-10-CM

## 2024-10-30 PROCEDURE — 97110 THERAPEUTIC EXERCISES: CPT | Mod: GP | Performed by: PHYSICAL THERAPIST

## 2024-10-30 ASSESSMENT — PAIN - FUNCTIONAL ASSESSMENT: PAIN_FUNCTIONAL_ASSESSMENT: 0-10

## 2024-10-30 ASSESSMENT — PAIN SCALES - GENERAL: PAINLEVEL_OUTOF10: 2

## 2024-11-13 ENCOUNTER — TREATMENT (OUTPATIENT)
Dept: PHYSICAL THERAPY | Facility: CLINIC | Age: 64
End: 2024-11-13
Payer: MEDICARE

## 2024-11-13 DIAGNOSIS — M25.511 RIGHT SHOULDER PAIN, UNSPECIFIED CHRONICITY: ICD-10-CM

## 2024-11-13 DIAGNOSIS — M54.2 NECK PAIN: Primary | ICD-10-CM

## 2024-11-13 DIAGNOSIS — R20.2 TINGLING OF RIGHT UPPER EXTREMITY: ICD-10-CM

## 2024-11-13 PROCEDURE — 97110 THERAPEUTIC EXERCISES: CPT | Mod: GP | Performed by: PHYSICAL THERAPIST

## 2024-11-13 ASSESSMENT — PAIN SCALES - GENERAL: PAINLEVEL_OUTOF10: 1

## 2024-11-13 ASSESSMENT — PAIN - FUNCTIONAL ASSESSMENT: PAIN_FUNCTIONAL_ASSESSMENT: 0-10

## 2024-11-13 NOTE — PROGRESS NOTES
Physical Therapy Treatment      Patient Name: Juvenal Bagley Jr.  MRN: [id number]  Today's Date: 11/13/2024  Visit #3  Time Calculation  Start Time: 0935  Stop Time: 1005  Time Calculation (min): 30 min    Insurance:  Visit Limit:  90 days  Date Range: 1/2/25  Co-Pay: None    Assessment:  Patient able demo proper mechanics and form during exercises. Today's session focused on strength, ROM, neuromuscular control, endurance, and joint mobility. Patient demonstrated good tolerance to session this date. They are demonstrating good progress in skilled rehabilitation at this time and will follow up prn. Discussed HEP for further self-management. All questions answered.    Patient's response to session: No change in pain, Increased ROM/joint mobility, Increase motor control, and Increased knowledge and understanding    Plan:  Continue per POC.    Current Problem:   1. Neck pain        2. Right shoulder pain, unspecified chronicity        3. Tingling of right upper extremity            Subjective   Patient reported having decrease in symptoms and feeling as though he has improved. Symptoms into arm occur infrequently and can be managed with exercises. Patient feels as though  he can manage all symptoms and will reach out PRN.     Pain Assessment: 0-10  0-10 (Numeric) Pain Score: 1    Precautions  Precautions Comment: None    Objective   Cervical AROM  Flexion: No loss  Extension: Mod loss  Rotation L/R: Min loss/No loss  Side Bending L/R: Min loss/No loss      Shoulder AROM (L/R)  Flexion: 170°/170°  Abduction: 170°/170°  Extension: 60°/60°  External Rotation: 90°/90°  Internal rotation: 70°/70°     Longus coli testing (Male normal 38.9 sec, Female 29.4 sec): 18 sec     Joint mobility testing (normal unless otherwise noted below)  C0-1:  C1-3:  C3-6:   C7-T5: hypo     Shoulder Special Tests (L/R)     Painful Arc: Negative/Negative  Infraspinatus MMT: Negative/Negative  Drop Arm Test: Negative/Negative  Lateral Prema:  Negative/Negative  Shrug Sign: Negative/Negative  Lift-Off: Negative/Negative  Neer: Negative/Negative  Lopez-Armond: Negative/ Negative  Apprehension/Surprise Test: Negative/Negative  Relocation Test: Negative/Negative  Load-Shift: Negative/Negative     NDI: 3    Treatments:  Therapeutic Exercise (07156): 26 minutes  HEP review  Standing chin tucks  Seated thoracic extensions*  Horizontal abduction, red TB  Rows, red TB    Manual Therapy (45365): 4 minutes  CTJ side glide B  T1-3 ext snags  Seated CTJ distraction    Neuromuscular Re-education (17446):  0 minutes  Not performed    Education and discussion on HEP and treatment regarding the benefits related to current condition, POC, pathophysiology, and precautions    *added to HEP    Care provided under direct supervision of Justin Hatch, PT, DPT, OCS, CSCS

## 2024-11-27 ENCOUNTER — APPOINTMENT (OUTPATIENT)
Dept: PHYSICAL THERAPY | Facility: CLINIC | Age: 64
End: 2024-11-27
Payer: MEDICARE

## 2024-12-17 DIAGNOSIS — G25.0 ESSENTIAL TREMOR: ICD-10-CM

## 2024-12-17 RX ORDER — PROPRANOLOL HYDROCHLORIDE 20 MG/1
20 TABLET ORAL 2 TIMES DAILY
Qty: 180 TABLET | Refills: 3 | Status: SHIPPED | OUTPATIENT
Start: 2024-12-17 | End: 2025-12-17

## 2024-12-28 ENCOUNTER — PATIENT MESSAGE (OUTPATIENT)
Dept: NEUROLOGY | Facility: CLINIC | Age: 64
End: 2024-12-28
Payer: MEDICARE

## 2024-12-28 DIAGNOSIS — G25.0 ESSENTIAL TREMOR: ICD-10-CM

## 2024-12-30 RX ORDER — PRIMIDONE 50 MG/1
125 TABLET ORAL NIGHTLY
Qty: 225 TABLET | Refills: 3 | Status: SHIPPED | OUTPATIENT
Start: 2024-12-30

## 2025-01-06 ENCOUNTER — TELEPHONE (OUTPATIENT)
Dept: CARDIOLOGY | Facility: CLINIC | Age: 65
End: 2025-01-06
Payer: MEDICARE

## 2025-01-07 ENCOUNTER — LAB (OUTPATIENT)
Dept: LAB | Facility: LAB | Age: 65
End: 2025-01-07
Payer: MEDICARE

## 2025-01-07 ENCOUNTER — HOSPITAL ENCOUNTER (OUTPATIENT)
Dept: CARDIOLOGY | Facility: HOSPITAL | Age: 65
Discharge: HOME | End: 2025-01-07
Payer: MEDICARE

## 2025-01-07 DIAGNOSIS — I77.819 AORTIC ECTASIA: ICD-10-CM

## 2025-01-07 DIAGNOSIS — I51.7 LVH (LEFT VENTRICULAR HYPERTROPHY): ICD-10-CM

## 2025-01-07 DIAGNOSIS — I77.819 AORTIC ECTASIA: Primary | ICD-10-CM

## 2025-01-07 DIAGNOSIS — R17 SERUM TOTAL BILIRUBIN ELEVATED: ICD-10-CM

## 2025-01-07 DIAGNOSIS — I10 HYPERTENSION, UNSPECIFIED TYPE: ICD-10-CM

## 2025-01-07 LAB
ALBUMIN SERPL BCP-MCNC: 4.7 G/DL (ref 3.4–5)
ALP SERPL-CCNC: 66 U/L (ref 33–136)
ALT SERPL W P-5'-P-CCNC: 30 U/L (ref 10–52)
ANION GAP SERPL CALC-SCNC: 13 MMOL/L (ref 10–20)
AST SERPL W P-5'-P-CCNC: 23 U/L (ref 9–39)
BASOPHILS # BLD AUTO: 0.05 X10*3/UL (ref 0–0.1)
BASOPHILS NFR BLD AUTO: 0.9 %
BILIRUB DIRECT SERPL-MCNC: 0.2 MG/DL (ref 0–0.3)
BILIRUB SERPL-MCNC: 1.1 MG/DL (ref 0–1.2)
BUN SERPL-MCNC: 16 MG/DL (ref 6–23)
CALCIUM SERPL-MCNC: 9.8 MG/DL (ref 8.6–10.6)
CHLORIDE SERPL-SCNC: 104 MMOL/L (ref 98–107)
CO2 SERPL-SCNC: 28 MMOL/L (ref 21–32)
CREAT SERPL-MCNC: 1.01 MG/DL (ref 0.5–1.3)
EGFRCR SERPLBLD CKD-EPI 2021: 82 ML/MIN/1.73M*2
EOSINOPHIL # BLD AUTO: 0.17 X10*3/UL (ref 0–0.7)
EOSINOPHIL NFR BLD AUTO: 3 %
ERYTHROCYTE [DISTWIDTH] IN BLOOD BY AUTOMATED COUNT: 12.5 % (ref 11.5–14.5)
GLUCOSE SERPL-MCNC: 92 MG/DL (ref 74–99)
HCT VFR BLD AUTO: 44.8 % (ref 41–52)
HGB BLD-MCNC: 15.1 G/DL (ref 13.5–17.5)
IMM GRANULOCYTES # BLD AUTO: 0.03 X10*3/UL (ref 0–0.7)
IMM GRANULOCYTES NFR BLD AUTO: 0.5 % (ref 0–0.9)
LYMPHOCYTES # BLD AUTO: 2.09 X10*3/UL (ref 1.2–4.8)
LYMPHOCYTES NFR BLD AUTO: 37 %
MCH RBC QN AUTO: 32 PG (ref 26–34)
MCHC RBC AUTO-ENTMCNC: 33.7 G/DL (ref 32–36)
MCV RBC AUTO: 95 FL (ref 80–100)
MONOCYTES # BLD AUTO: 0.67 X10*3/UL (ref 0.1–1)
MONOCYTES NFR BLD AUTO: 11.9 %
NEUTROPHILS # BLD AUTO: 2.64 X10*3/UL (ref 1.2–7.7)
NEUTROPHILS NFR BLD AUTO: 46.7 %
NRBC BLD-RTO: 0 /100 WBCS (ref 0–0)
PLATELET # BLD AUTO: 220 X10*3/UL (ref 150–450)
POTASSIUM SERPL-SCNC: 4.7 MMOL/L (ref 3.5–5.3)
PROT SERPL-MCNC: 7.2 G/DL (ref 6.4–8.2)
RBC # BLD AUTO: 4.72 X10*6/UL (ref 4.5–5.9)
SODIUM SERPL-SCNC: 140 MMOL/L (ref 136–145)
WBC # BLD AUTO: 5.7 X10*3/UL (ref 4.4–11.3)

## 2025-01-07 PROCEDURE — 93306 TTE W/DOPPLER COMPLETE: CPT | Performed by: INTERNAL MEDICINE

## 2025-01-07 PROCEDURE — 82248 BILIRUBIN DIRECT: CPT

## 2025-01-07 PROCEDURE — 80053 COMPREHEN METABOLIC PANEL: CPT

## 2025-01-07 PROCEDURE — 85025 COMPLETE CBC W/AUTO DIFF WBC: CPT

## 2025-01-07 PROCEDURE — 93306 TTE W/DOPPLER COMPLETE: CPT

## 2025-01-08 LAB
AORTIC VALVE MEAN GRADIENT: 4 MMHG
AORTIC VALVE PEAK VELOCITY: 1.26 M/S
AV PEAK GRADIENT: 6 MMHG
AVA (PEAK VEL): 3.29 CM2
AVA (VTI): 3.92 CM2
EJECTION FRACTION APICAL 4 CHAMBER: 55.4
EJECTION FRACTION: 55 %
LEFT ATRIUM VOLUME AREA LENGTH INDEX BSA: 37.1 ML/M2
LEFT VENTRICLE INTERNAL DIMENSION DIASTOLE: 4.7 CM (ref 3.5–6)
LEFT VENTRICULAR OUTFLOW TRACT DIAMETER: 2.3 CM
LV EJECTION FRACTION BIPLANE: 55 %
MITRAL VALVE E/A RATIO: 1.08
RIGHT VENTRICLE FREE WALL PEAK S': 11.5 CM/S
RIGHT VENTRICLE PEAK SYSTOLIC PRESSURE: 28.6 MMHG
TRICUSPID ANNULAR PLANE SYSTOLIC EXCURSION: 2.4 CM

## 2025-01-22 DIAGNOSIS — M10.9 ACUTE GOUT OF RIGHT FOOT, UNSPECIFIED CAUSE: Primary | ICD-10-CM

## 2025-01-22 RX ORDER — COLCHICINE 0.6 MG/1
TABLET ORAL
Qty: 16 TABLET | Refills: 0 | Status: SHIPPED | OUTPATIENT
Start: 2025-01-22 | End: 2025-01-30

## 2025-01-31 ENCOUNTER — HOSPITAL ENCOUNTER (OUTPATIENT)
Dept: RADIOLOGY | Facility: HOSPITAL | Age: 65
Discharge: HOME | End: 2025-01-31
Payer: MEDICARE

## 2025-01-31 DIAGNOSIS — I77.819 AORTIC ECTASIA: ICD-10-CM

## 2025-01-31 PROCEDURE — 2550000001 HC RX 255 CONTRASTS: Performed by: NURSE PRACTITIONER

## 2025-01-31 PROCEDURE — 71275 CT ANGIOGRAPHY CHEST: CPT

## 2025-01-31 RX ADMIN — IOHEXOL 90 ML: 350 INJECTION, SOLUTION INTRAVENOUS at 10:55

## 2025-02-07 ENCOUNTER — APPOINTMENT (OUTPATIENT)
Dept: CARDIOLOGY | Facility: CLINIC | Age: 65
End: 2025-02-07
Payer: MEDICARE

## 2025-02-07 VITALS — BODY MASS INDEX: 36.18 KG/M2 | WEIGHT: 245 LBS

## 2025-02-07 DIAGNOSIS — I10 BENIGN ESSENTIAL HYPERTENSION: ICD-10-CM

## 2025-02-07 DIAGNOSIS — I35.1 NONRHEUMATIC AORTIC VALVE INSUFFICIENCY: ICD-10-CM

## 2025-02-07 DIAGNOSIS — I77.819 AORTIC ECTASIA: Primary | ICD-10-CM

## 2025-02-07 DIAGNOSIS — E78.2 MIXED HYPERLIPIDEMIA: ICD-10-CM

## 2025-02-07 PROCEDURE — 1159F MED LIST DOCD IN RCRD: CPT | Performed by: NURSE PRACTITIONER

## 2025-02-07 PROCEDURE — 1160F RVW MEDS BY RX/DR IN RCRD: CPT | Performed by: NURSE PRACTITIONER

## 2025-02-07 PROCEDURE — 1036F TOBACCO NON-USER: CPT | Performed by: NURSE PRACTITIONER

## 2025-02-07 PROCEDURE — 1123F ACP DISCUSS/DSCN MKR DOCD: CPT | Performed by: NURSE PRACTITIONER

## 2025-02-07 PROCEDURE — 99214 OFFICE O/P EST MOD 30 MIN: CPT | Performed by: NURSE PRACTITIONER

## 2025-02-07 NOTE — PROGRESS NOTES
"Name : Juvenal Bagley Jr.   : 1960   MRN : [id number]   ENC Date : 2025    Virtual Visit    CC: Annual Exam     HPI:    Juvenal Bagley Jr. is a 64 y.o. male with PMHx sig for abnormal EKG, HTN, HLD, mild aortic insufficiency, ascending aortic ectasia, MARIE on CPAP & essential tremor who presents with his wife today for annual cardiovascular follow up.     He has done very well since his last visit. Reports no major health issues and has had no hospitalizations. Denies any chest pain, pressure, SOB/FELICIANO, PND, orthopnea, LE edema, palpitations, lightheadedness, dizziness, or syncope at rest or with exertions. He is compliant with his medications and reports no side effects. He has been physically active.     Riding his stationary bike \"[employer] on steroids\" through courses 3x a week; upwards of 6 to 15 miles as well as working out on the [employer].     CV Diagnostics:  CTA chest 25:  1.  Mildly ectatic ascending thoracic aorta measuring 3.9 cm in mid ascending segment. The rest of the thoracic aorta is normal in caliber. No evidence of acute aortic pathology.  2. Diffuse hepatic steatosis. Consider PCP evaluation.    Echo 25: EF 55%, mild AR, RVSP 29 mmhg    Echo 24: EF 50-55%, impaired relaxation, mild asymmetric LVH, RVSP 32 mm, mild to mod AR     CAC score 24: 0. The visualized mid/lower ascending thoracic aorta measures 3.9 cm in diameter.     Echo 2017: EF 55-60%, mild TR    ROS: unless otherwise noted in the history of present illness, all other systems were reviewed and they are negative for complaints     Allergies:  Cefaclor, Doxycycline, and Sulfa (sulfonamide antibiotics)    Current Outpatient Medications   Medication Instructions    glucosamine/chondr quach A sod (OSTEO BI-FLEX ORAL) 1 tablet, 2 times daily    lisinopril 20 mg, oral, Daily    multivitamin tablet 1 tablet, Daily    primidone (MYSOLINE) 125 mg, oral, Nightly    propranolol (INDERAL) 20 mg, oral, 2 times daily      " "  Last Labs:  CBC  Lab Results   Component Value Date    WBC 5.7 01/07/2025    HGB 15.1 01/07/2025    HCT 44.8 01/07/2025    MCV 95 01/07/2025     01/07/2025       CMP  Lab Results   Component Value Date    CALCIUM 9.8 01/07/2025    PROT 7.2 01/07/2025    ALBUMIN 4.7 01/07/2025    AST 23 01/07/2025    ALT 30 01/07/2025    ALKPHOS 66 01/07/2025    BILITOT 1.1 01/07/2025       BMP   Lab Results   Component Value Date     01/07/2025    K 4.7 01/07/2025     01/07/2025    CO2 28 01/07/2025    GLUCOSE 92 01/07/2025    BUN 16 01/07/2025    CREATININE 1.01 01/07/2025       LIPID PANEL   Lab Results   Component Value Date    CHOL 172 10/15/2024    TRIG 141 10/15/2024    HDL 37.4 10/15/2024    CHHDL 4.6 10/15/2024    LDLF 104 (H) 09/03/2021    VLDL 28 10/15/2024    NHDL 135 10/15/2024       RENAL FUNCTION PANEL   Lab Results   Component Value Date    GLUCOSE 92 01/07/2025     01/07/2025    K 4.7 01/07/2025     01/07/2025    CO2 28 01/07/2025    ANIONGAP 13 01/07/2025    BUN 16 01/07/2025    CREATININE 1.01 01/07/2025    CALCIUM 9.8 01/07/2025    ALBUMIN 4.7 01/07/2025        No results found for: \"BNP\", \"HGBA1C\"  I have reviewed the above labs & diagnostics    Last Recorded Vitals:  Vitals:    02/07/25 0844   Weight: 111 kg (245 lb)     Physical Exam:  A+Ox3, NAD    Assessment/Plan:  Hypertension. Started on lisinopril last year. Asked that he obtain BP cuff to track BP at home. If SBP is still > 130 mmHg then I would increase his lisinopril    Hyperlipidemia. ASCVD risk 24.9%. Recommend starting Atorvastatin 40 mg every day. CMP 2 weeks. Repeat lipids in 1-3 months. Juvenal would like to try mediterranean diet first, which I am amenable. Re-evaluate in 6 months    Aortic insufficiency. Mild by echocardiography. Asymptomatic. C/w clinical surveillance    LVH. Mild. C/w aggressive risk factor modification    Thoracic Aortic ectasia. Control BP & CHO as above.    Follow up with Dr. Alejo in " 6 month to establish collaborative care & me in 1 year.       Tracy M Schwab, APRN-CNP

## 2025-02-19 DIAGNOSIS — I10 HYPERTENSION, UNSPECIFIED TYPE: ICD-10-CM

## 2025-02-19 RX ORDER — LISINOPRIL 40 MG/1
40 TABLET ORAL DAILY
Qty: 90 TABLET | Refills: 3 | Status: SHIPPED | OUTPATIENT
Start: 2025-02-19 | End: 2025-02-20 | Stop reason: SDUPTHER

## 2025-02-20 DIAGNOSIS — I10 HYPERTENSION, UNSPECIFIED TYPE: ICD-10-CM

## 2025-02-20 RX ORDER — LISINOPRIL 40 MG/1
40 TABLET ORAL DAILY
Qty: 90 TABLET | Refills: 3 | Status: SHIPPED | OUTPATIENT
Start: 2025-02-20 | End: 2026-02-15

## 2025-02-20 RX ORDER — LISINOPRIL 40 MG/1
40 TABLET ORAL DAILY
Qty: 30 TABLET | Refills: 3 | Status: SHIPPED | OUTPATIENT
Start: 2025-02-20 | End: 2025-02-20 | Stop reason: SDUPTHER

## 2025-03-13 ENCOUNTER — APPOINTMENT (OUTPATIENT)
Dept: CARDIOLOGY | Facility: HOSPITAL | Age: 65
End: 2025-03-13
Payer: MEDICARE

## 2025-03-13 ENCOUNTER — APPOINTMENT (OUTPATIENT)
Dept: RADIOLOGY | Facility: HOSPITAL | Age: 65
End: 2025-03-13
Payer: MEDICARE

## 2025-03-13 ENCOUNTER — HOSPITAL ENCOUNTER (EMERGENCY)
Facility: HOSPITAL | Age: 65
Discharge: HOME | End: 2025-03-13
Attending: EMERGENCY MEDICINE
Payer: MEDICARE

## 2025-03-13 VITALS
OXYGEN SATURATION: 95 % | HEART RATE: 61 BPM | SYSTOLIC BLOOD PRESSURE: 125 MMHG | DIASTOLIC BLOOD PRESSURE: 60 MMHG | TEMPERATURE: 96.8 F | RESPIRATION RATE: 16 BRPM | HEIGHT: 69 IN | WEIGHT: 235 LBS | BODY MASS INDEX: 34.8 KG/M2

## 2025-03-13 DIAGNOSIS — R10.9 FLANK PAIN: ICD-10-CM

## 2025-03-13 DIAGNOSIS — N20.1 URETEROLITHIASIS: ICD-10-CM

## 2025-03-13 DIAGNOSIS — Q62.11 HYDRONEPHROSIS WITH URETEROPELVIC JUNCTION (UPJ) OBSTRUCTION: Primary | ICD-10-CM

## 2025-03-13 LAB
ALBUMIN SERPL BCP-MCNC: 4.8 G/DL (ref 3.4–5)
ALP SERPL-CCNC: 67 U/L (ref 33–136)
ALT SERPL W P-5'-P-CCNC: 27 U/L (ref 10–52)
ANION GAP SERPL CALC-SCNC: 15 MMOL/L (ref 10–20)
APPEARANCE UR: CLEAR
AST SERPL W P-5'-P-CCNC: 21 U/L (ref 9–39)
ATRIAL RATE: 49 BPM
BASOPHILS # BLD AUTO: 0.06 X10*3/UL (ref 0–0.1)
BASOPHILS NFR BLD AUTO: 0.8 %
BILIRUB SERPL-MCNC: 0.8 MG/DL (ref 0–1.2)
BILIRUB UR STRIP.AUTO-MCNC: NEGATIVE MG/DL
BUN SERPL-MCNC: 17 MG/DL (ref 6–23)
CALCIUM SERPL-MCNC: 9.7 MG/DL (ref 8.6–10.3)
CHLORIDE SERPL-SCNC: 103 MMOL/L (ref 98–107)
CO2 SERPL-SCNC: 24 MMOL/L (ref 21–32)
COLOR UR: ABNORMAL
CREAT SERPL-MCNC: 1.1 MG/DL (ref 0.5–1.3)
EGFRCR SERPLBLD CKD-EPI 2021: 74 ML/MIN/1.73M*2
EOSINOPHIL # BLD AUTO: 0.25 X10*3/UL (ref 0–0.7)
EOSINOPHIL NFR BLD AUTO: 3.5 %
ERYTHROCYTE [DISTWIDTH] IN BLOOD BY AUTOMATED COUNT: 12.8 % (ref 11.5–14.5)
GLUCOSE SERPL-MCNC: 109 MG/DL (ref 74–99)
GLUCOSE UR STRIP.AUTO-MCNC: NORMAL MG/DL
HCT VFR BLD AUTO: 45.2 % (ref 41–52)
HGB BLD-MCNC: 14.9 G/DL (ref 13.5–17.5)
HOLD SPECIMEN: NORMAL
IMM GRANULOCYTES # BLD AUTO: 0.04 X10*3/UL (ref 0–0.7)
IMM GRANULOCYTES NFR BLD AUTO: 0.6 % (ref 0–0.9)
KETONES UR STRIP.AUTO-MCNC: ABNORMAL MG/DL
LACTATE SERPL-SCNC: 1.4 MMOL/L (ref 0.4–2)
LEUKOCYTE ESTERASE UR QL STRIP.AUTO: NEGATIVE
LIPASE SERPL-CCNC: 22 U/L (ref 9–82)
LYMPHOCYTES # BLD AUTO: 2.6 X10*3/UL (ref 1.2–4.8)
LYMPHOCYTES NFR BLD AUTO: 36.8 %
MCH RBC QN AUTO: 31.8 PG (ref 26–34)
MCHC RBC AUTO-ENTMCNC: 33 G/DL (ref 32–36)
MCV RBC AUTO: 97 FL (ref 80–100)
MONOCYTES # BLD AUTO: 0.81 X10*3/UL (ref 0.1–1)
MONOCYTES NFR BLD AUTO: 11.5 %
MUCOUS THREADS #/AREA URNS AUTO: ABNORMAL /LPF
NEUTROPHILS # BLD AUTO: 3.31 X10*3/UL (ref 1.2–7.7)
NEUTROPHILS NFR BLD AUTO: 46.8 %
NITRITE UR QL STRIP.AUTO: NEGATIVE
NRBC BLD-RTO: 0 /100 WBCS (ref 0–0)
P AXIS: 27 DEGREES
P OFFSET: 183 MS
P ONSET: 118 MS
PH UR STRIP.AUTO: 5.5 [PH]
PLATELET # BLD AUTO: 224 X10*3/UL (ref 150–450)
POTASSIUM SERPL-SCNC: 3.9 MMOL/L (ref 3.5–5.3)
PR INTERVAL: 208 MS
PROT SERPL-MCNC: 7.5 G/DL (ref 6.4–8.2)
PROT UR STRIP.AUTO-MCNC: NEGATIVE MG/DL
Q ONSET: 222 MS
QRS COUNT: 8 BEATS
QRS DURATION: 90 MS
QT INTERVAL: 458 MS
QTC CALCULATION(BAZETT): 413 MS
QTC FREDERICIA: 428 MS
R AXIS: -2 DEGREES
RBC # BLD AUTO: 4.68 X10*6/UL (ref 4.5–5.9)
RBC # UR STRIP.AUTO: ABNORMAL MG/DL
RBC #/AREA URNS AUTO: >20 /HPF
SODIUM SERPL-SCNC: 138 MMOL/L (ref 136–145)
SP GR UR STRIP.AUTO: 1.05
T AXIS: 14 DEGREES
T OFFSET: 451 MS
UROBILINOGEN UR STRIP.AUTO-MCNC: NORMAL MG/DL
VENTRICULAR RATE: 49 BPM
WBC # BLD AUTO: 7.1 X10*3/UL (ref 4.4–11.3)
WBC #/AREA URNS AUTO: ABNORMAL /HPF

## 2025-03-13 PROCEDURE — 74177 CT ABD & PELVIS W/CONTRAST: CPT | Performed by: RADIOLOGY

## 2025-03-13 PROCEDURE — 99285 EMERGENCY DEPT VISIT HI MDM: CPT | Mod: 25 | Performed by: EMERGENCY MEDICINE

## 2025-03-13 PROCEDURE — 2500000004 HC RX 250 GENERAL PHARMACY W/ HCPCS (ALT 636 FOR OP/ED)

## 2025-03-13 PROCEDURE — 83690 ASSAY OF LIPASE: CPT

## 2025-03-13 PROCEDURE — 83605 ASSAY OF LACTIC ACID: CPT

## 2025-03-13 PROCEDURE — 96361 HYDRATE IV INFUSION ADD-ON: CPT

## 2025-03-13 PROCEDURE — 99285 EMERGENCY DEPT VISIT HI MDM: CPT

## 2025-03-13 PROCEDURE — 93005 ELECTROCARDIOGRAM TRACING: CPT

## 2025-03-13 PROCEDURE — 96374 THER/PROPH/DIAG INJ IV PUSH: CPT | Mod: 59

## 2025-03-13 PROCEDURE — 2500000002 HC RX 250 W HCPCS SELF ADMINISTERED DRUGS (ALT 637 FOR MEDICARE OP, ALT 636 FOR OP/ED): Mod: MUE

## 2025-03-13 PROCEDURE — 2550000001 HC RX 255 CONTRASTS

## 2025-03-13 PROCEDURE — 74177 CT ABD & PELVIS W/CONTRAST: CPT

## 2025-03-13 PROCEDURE — 96375 TX/PRO/DX INJ NEW DRUG ADDON: CPT

## 2025-03-13 PROCEDURE — 2500000004 HC RX 250 GENERAL PHARMACY W/ HCPCS (ALT 636 FOR OP/ED): Performed by: EMERGENCY MEDICINE

## 2025-03-13 PROCEDURE — 80053 COMPREHEN METABOLIC PANEL: CPT

## 2025-03-13 PROCEDURE — 85025 COMPLETE CBC W/AUTO DIFF WBC: CPT

## 2025-03-13 PROCEDURE — 36415 COLL VENOUS BLD VENIPUNCTURE: CPT

## 2025-03-13 PROCEDURE — 81001 URINALYSIS AUTO W/SCOPE: CPT

## 2025-03-13 RX ORDER — KETOROLAC TROMETHAMINE 15 MG/ML
15 INJECTION, SOLUTION INTRAMUSCULAR; INTRAVENOUS ONCE
Status: COMPLETED | OUTPATIENT
Start: 2025-03-13 | End: 2025-03-13

## 2025-03-13 RX ORDER — MORPHINE SULFATE 4 MG/ML
4 INJECTION, SOLUTION INTRAMUSCULAR; INTRAVENOUS ONCE
Status: COMPLETED | OUTPATIENT
Start: 2025-03-13 | End: 2025-03-13

## 2025-03-13 RX ORDER — TAMSULOSIN HYDROCHLORIDE 0.4 MG/1
0.4 CAPSULE ORAL DAILY
Qty: 10 CAPSULE | Refills: 0 | Status: ON HOLD | OUTPATIENT
Start: 2025-03-13 | End: 2025-03-19

## 2025-03-13 RX ORDER — ONDANSETRON HYDROCHLORIDE 2 MG/ML
4 INJECTION, SOLUTION INTRAVENOUS ONCE
Status: COMPLETED | OUTPATIENT
Start: 2025-03-13 | End: 2025-03-13

## 2025-03-13 RX ORDER — TAMSULOSIN HYDROCHLORIDE 0.4 MG/1
0.4 CAPSULE ORAL ONCE
Status: COMPLETED | OUTPATIENT
Start: 2025-03-13 | End: 2025-03-13

## 2025-03-13 RX ORDER — IBUPROFEN 600 MG/1
600 TABLET ORAL EVERY 6 HOURS PRN
Qty: 28 TABLET | Refills: 0 | Status: SHIPPED | OUTPATIENT
Start: 2025-03-13 | End: 2025-03-19 | Stop reason: HOSPADM

## 2025-03-13 RX ORDER — OXYCODONE HYDROCHLORIDE 5 MG/1
5 TABLET ORAL EVERY 6 HOURS PRN
Qty: 12 TABLET | Refills: 0 | Status: SHIPPED | OUTPATIENT
Start: 2025-03-13

## 2025-03-13 RX ADMIN — TAMSULOSIN HYDROCHLORIDE 0.4 MG: 0.4 CAPSULE ORAL at 06:10

## 2025-03-13 RX ADMIN — IOHEXOL 75 ML: 350 INJECTION, SOLUTION INTRAVENOUS at 04:05

## 2025-03-13 RX ADMIN — SODIUM CHLORIDE, POTASSIUM CHLORIDE, SODIUM LACTATE AND CALCIUM CHLORIDE 1000 ML: 600; 310; 30; 20 INJECTION, SOLUTION INTRAVENOUS at 03:32

## 2025-03-13 RX ADMIN — ONDANSETRON 4 MG: 2 INJECTION INTRAMUSCULAR; INTRAVENOUS at 03:32

## 2025-03-13 RX ADMIN — KETOROLAC TROMETHAMINE 15 MG: 15 INJECTION, SOLUTION INTRAMUSCULAR; INTRAVENOUS at 05:04

## 2025-03-13 RX ADMIN — MORPHINE SULFATE 4 MG: 4 INJECTION, SOLUTION INTRAMUSCULAR; INTRAVENOUS at 03:32

## 2025-03-13 ASSESSMENT — PAIN DESCRIPTION - LOCATION: LOCATION: OTHER (COMMENT)

## 2025-03-13 ASSESSMENT — PAIN DESCRIPTION - PAIN TYPE: TYPE: ACUTE PAIN

## 2025-03-13 ASSESSMENT — COLUMBIA-SUICIDE SEVERITY RATING SCALE - C-SSRS
2. HAVE YOU ACTUALLY HAD ANY THOUGHTS OF KILLING YOURSELF?: NO
6. HAVE YOU EVER DONE ANYTHING, STARTED TO DO ANYTHING, OR PREPARED TO DO ANYTHING TO END YOUR LIFE?: NO
1. IN THE PAST MONTH, HAVE YOU WISHED YOU WERE DEAD OR WISHED YOU COULD GO TO SLEEP AND NOT WAKE UP?: NO

## 2025-03-13 ASSESSMENT — PAIN SCALES - GENERAL
PAINLEVEL_OUTOF10: 9
PAINLEVEL_OUTOF10: 8
PAINLEVEL_OUTOF10: 4

## 2025-03-13 ASSESSMENT — PAIN DESCRIPTION - ONSET: ONSET: SUDDEN

## 2025-03-13 ASSESSMENT — PAIN DESCRIPTION - FREQUENCY: FREQUENCY: CONSTANT/CONTINUOUS

## 2025-03-13 ASSESSMENT — PAIN DESCRIPTION - DESCRIPTORS: DESCRIPTORS: DULL

## 2025-03-13 ASSESSMENT — PAIN DESCRIPTION - DIRECTION: RADIATING_TOWARDS: LOWER BACK

## 2025-03-13 ASSESSMENT — PAIN DESCRIPTION - PROGRESSION: CLINICAL_PROGRESSION: NOT CHANGED

## 2025-03-13 ASSESSMENT — PAIN - FUNCTIONAL ASSESSMENT: PAIN_FUNCTIONAL_ASSESSMENT: 0-10

## 2025-03-13 ASSESSMENT — PAIN DESCRIPTION - ORIENTATION: ORIENTATION: LEFT;LOWER

## 2025-03-13 NOTE — DISCHARGE INSTRUCTIONS
Your imagining shows you have a 6 mm obstructing kidney stone and mild swelling to your left kidney.  Take the Flomax as prescribed.  May take the oxycodone and ibuprofen as needed for discomfort.  Increase your fluid intake.  Follow-up with your urologist or the urologist provided.  If symptoms worsen or new symptoms present return to the emergency department.

## 2025-03-13 NOTE — ED PROVIDER NOTES
History of Present Illness     History provided by: Patient and Significant Other  Limitations to History: None  External Records Reviewed with Brief Summary: None    HPI:  Juvenal Bagley Jr. is a 65 y.o. male with history of kidney stones, hypertension and tremors who presents to the emergency department for concern of 8/10 left lower abdominal dull pain radiating to the left flank since 2 AM.  He denies fever, chills, body aches, headache, dizziness, chest pain, shortness of breath, nausea, vomiting, hematuria, urinary frequency or urgency, penile discharge or pain, constipation or diarrhea.    Physical Exam   Triage vitals:  T 36 °C (96.8 °F)  HR 53  BP (!) 199/89  RR 17  O2 100 % None (Room air)    Physical Exam  Vitals and nursing note reviewed.   Constitutional:       General: He is not in acute distress.     Appearance: He is not ill-appearing or toxic-appearing.   HENT:      Head: Normocephalic and atraumatic.      Right Ear: External ear normal.      Left Ear: External ear normal.      Nose: Nose normal.      Mouth/Throat:      Mouth: Mucous membranes are moist.      Pharynx: Oropharynx is clear.   Cardiovascular:      Rate and Rhythm: Normal rate and regular rhythm.      Pulses: Normal pulses.      Heart sounds: Normal heart sounds.   Pulmonary:      Effort: Pulmonary effort is normal.      Breath sounds: Normal breath sounds.   Abdominal:      General: Abdomen is flat. Bowel sounds are normal. There is no distension.      Palpations: Abdomen is soft. There is no mass.      Tenderness: There is abdominal tenderness in the left lower quadrant. There is no right CVA tenderness, left CVA tenderness, guarding or rebound.      Hernia: No hernia is present.   Musculoskeletal:      Cervical back: Normal range of motion and neck supple.   Neurological:      Mental Status: He is alert.          Medical Decision Making & ED Course   Medical Decision Makin y.o. male who presents to the emergency department  for concerns of left flank and left lower quadrant pain.    Upon examination, vitals are stable.  Patient is alert to person, place and time.  Is overall well-appearing and in no acute distress.  Head normocephalic.  EACs unremarkable.  Nares patent. No swelling, exudate or erythema to posterior oropharynx. No neck swelling, nuchal rigidity or cervical adenopathy.  Lung sounds clear to auscultation.  No adventitious lung sounds.  Regular heart rate and rhythm.  Abdomen is soft, nondistended.  There is tenderness to the left lower quadrant upon palpation. Normal active bowel sounds.  No palpable masses.  No CVA tenderness.     Differential diagnoses considered include but are not limited to: Diverticulitis, kidney stone, gastroenteritis, obstruction       EKG Independent Interpretation: EKG interpreted by myself. Please see ED Course for full interpretation.    Independent Result Review and Interpretation: Relevant laboratory and radiographic results were reviewed and independently interpreted by myself.  As necessary, they are commented on in the ED Course.      ED Course:  ED Course as of 03/13/25 0640   Thu Mar 13, 2025   0311 Will obtain basic labs, lipase, lactate, urinalysis and CT/abdomen/pelvis.  Will treat with fluids, Zofran and morphine for discomfort. [ED]   0326 Rate: 49 bpm  Rhythm: Sinus Bradycardia  Axis: Normal  NV Interval: Normal  ST Segment: No ST elevation  Comparison to Prior: Vent. Rate decreased by 28 bpm. Non-specific change in ST segment in Anterior leads. QT has shortened.      Patient is asymptomatic, takes propranolol and has a history of bradycardia. [ED]   0340 CBC and Auto Differential  No leukocytosis, acute anemia or thrombocytopenia. [ED]   0400 Comprehensive metabolic panel(!)  No electrolyte derangement, PORTER or hepatic abnormalities. [ED]   0422 Toradol ordered. [ED]   0430 CT abdomen pelvis w IV contrast  Obstructing 6 mm stone in the proximal left ureter just past the UPJ  with  mild left hydronephrosis      Indeterminate 1.9 cm hypodensity extending from the lower aspect of  the left kidney measuring greater than simple fluid density.  Recommend nonemergent MRI to further characterize.      Gastric wall thickening versus underdistention. Correlate for  symptoms of gastritis      Scattered colonic diverticulosis without CT evidence of acute  diverticulitis.   [ED]   0600 Urinalysis with Reflex Culture and Microscopic(!)  Shows no infection [ED]   0605 Patient reports improvement to the pain after the morphine and Toradol.  Home-going with Flomax, oxycodone and ibuprofen.  Patient educated to increase his water intake.  Patient educated to follow-up with his urologist or the urologist provided.  Patient provided return precautions.  Patient agreed with discharge plan, verbalized understanding and left in stable condition. [ED]      ED Course User Index  [ED] JOSE Pedro         Diagnoses as of 03/13/25 0640   Hydronephrosis with ureteropelvic junction (UPJ) obstruction   Ureterolithiasis   Flank pain     Disposition   Discharge    Procedures   Procedures    This was a shared visit with an ED attending.  The patient was seen and discussed with the ED attending Dr. Long Ro, DO  Emergency Medicine     JOSE Pedro  03/13/25 0613      This patient was seen by the advanced practice provider.  I have personally performed a substantive portion of the encounter.  I have seen and examined the patient; agree with the workup, evaluation, MDM, management and diagnosis.  The care plan has been discussed.      I personally saw the patient and made/approved the management plan and take responsibility for the patient management.    I evaluated the patient, the kidney stone appears to be proximal, 6 mm, and is moderate-sized.  Notified the patient that it is possibly a 70% chance to 60% chance of passing on its own.  He does not recall if he passed 1 this  big in the past.  Given the proximal nature we will utilize Flomax as well.  Patient already has a urologist over at [facility], and he was asked to call to make an appointment today for later on this week.  If the stone does not pass quickly, they may have to put a stent and lithotripsy.  He is to return to the emergency room for any signs of infection including chills, fever, nausea, vomiting or worsening pain, as infections of kidney stones can become septic quickly and could result in kidney failure or death.  Therefore again he is to return if there is any sign of infection.  Patient understands will follow-up with his urologist.  He was given medications for home, for pain control and again is to return to the emergency room for any worsening symptoms.     Long Ro,   03/13/25 0641

## 2025-03-18 ENCOUNTER — APPOINTMENT (OUTPATIENT)
Dept: RADIOLOGY | Facility: HOSPITAL | Age: 65
End: 2025-03-18
Payer: MEDICARE

## 2025-03-18 ENCOUNTER — ANESTHESIA (OUTPATIENT)
Dept: OPERATING ROOM | Facility: HOSPITAL | Age: 65
End: 2025-03-18
Payer: MEDICARE

## 2025-03-18 ENCOUNTER — ANESTHESIA EVENT (OUTPATIENT)
Dept: OPERATING ROOM | Facility: HOSPITAL | Age: 65
End: 2025-03-18
Payer: MEDICARE

## 2025-03-18 ENCOUNTER — HOSPITAL ENCOUNTER (OUTPATIENT)
Facility: HOSPITAL | Age: 65
Setting detail: OBSERVATION
Discharge: HOME | End: 2025-03-19
Attending: STUDENT IN AN ORGANIZED HEALTH CARE EDUCATION/TRAINING PROGRAM | Admitting: STUDENT IN AN ORGANIZED HEALTH CARE EDUCATION/TRAINING PROGRAM
Payer: MEDICARE

## 2025-03-18 DIAGNOSIS — N17.9 AKI (ACUTE KIDNEY INJURY) (CMS-HCC): ICD-10-CM

## 2025-03-18 DIAGNOSIS — R52 UNCONTROLLED PAIN: ICD-10-CM

## 2025-03-18 DIAGNOSIS — Q62.11 HYDRONEPHROSIS WITH URETEROPELVIC JUNCTION (UPJ) OBSTRUCTION: ICD-10-CM

## 2025-03-18 DIAGNOSIS — N20.0 KIDNEY STONE: Primary | ICD-10-CM

## 2025-03-18 LAB
ALBUMIN SERPL BCP-MCNC: 4.4 G/DL (ref 3.4–5)
ALP SERPL-CCNC: 65 U/L (ref 33–136)
ALT SERPL W P-5'-P-CCNC: 18 U/L (ref 10–52)
ANION GAP SERPL CALC-SCNC: 16 MMOL/L (ref 10–20)
APPEARANCE UR: CLEAR
AST SERPL W P-5'-P-CCNC: 25 U/L (ref 9–39)
ATRIAL RATE: 49 BPM
BASOPHILS # BLD AUTO: 0.05 X10*3/UL (ref 0–0.1)
BASOPHILS NFR BLD AUTO: 0.6 %
BILIRUB SERPL-MCNC: 0.6 MG/DL (ref 0–1.2)
BILIRUB UR STRIP.AUTO-MCNC: NEGATIVE MG/DL
BUN SERPL-MCNC: 28 MG/DL (ref 6–23)
CALCIUM SERPL-MCNC: 9.2 MG/DL (ref 8.6–10.3)
CHLORIDE SERPL-SCNC: 102 MMOL/L (ref 98–107)
CO2 SERPL-SCNC: 20 MMOL/L (ref 21–32)
COLOR UR: NORMAL
CREAT SERPL-MCNC: 2.23 MG/DL (ref 0.5–1.3)
EGFRCR SERPLBLD CKD-EPI 2021: 32 ML/MIN/1.73M*2
EOSINOPHIL # BLD AUTO: 0.23 X10*3/UL (ref 0–0.7)
EOSINOPHIL NFR BLD AUTO: 2.8 %
ERYTHROCYTE [DISTWIDTH] IN BLOOD BY AUTOMATED COUNT: 12.5 % (ref 11.5–14.5)
GLUCOSE SERPL-MCNC: 129 MG/DL (ref 74–99)
GLUCOSE UR STRIP.AUTO-MCNC: NORMAL MG/DL
HCT VFR BLD AUTO: 45.8 % (ref 41–52)
HGB BLD-MCNC: 14.7 G/DL (ref 13.5–17.5)
HOLD SPECIMEN: NORMAL
IMM GRANULOCYTES # BLD AUTO: 0.05 X10*3/UL (ref 0–0.7)
IMM GRANULOCYTES NFR BLD AUTO: 0.6 % (ref 0–0.9)
KETONES UR STRIP.AUTO-MCNC: NEGATIVE MG/DL
LEUKOCYTE ESTERASE UR QL STRIP.AUTO: NEGATIVE
LYMPHOCYTES # BLD AUTO: 1.63 X10*3/UL (ref 1.2–4.8)
LYMPHOCYTES NFR BLD AUTO: 20.1 %
MCH RBC QN AUTO: 31.7 PG (ref 26–34)
MCHC RBC AUTO-ENTMCNC: 32.1 G/DL (ref 32–36)
MCV RBC AUTO: 99 FL (ref 80–100)
MONOCYTES # BLD AUTO: 0.97 X10*3/UL (ref 0.1–1)
MONOCYTES NFR BLD AUTO: 12 %
NEUTROPHILS # BLD AUTO: 5.17 X10*3/UL (ref 1.2–7.7)
NEUTROPHILS NFR BLD AUTO: 63.9 %
NITRITE UR QL STRIP.AUTO: NEGATIVE
NRBC BLD-RTO: 0 /100 WBCS (ref 0–0)
P AXIS: 27 DEGREES
P OFFSET: 183 MS
P ONSET: 118 MS
PH UR STRIP.AUTO: 5.5 [PH]
PLATELET # BLD AUTO: 204 X10*3/UL (ref 150–450)
POTASSIUM SERPL-SCNC: 4.5 MMOL/L (ref 3.5–5.3)
PR INTERVAL: 208 MS
PROT SERPL-MCNC: 7.4 G/DL (ref 6.4–8.2)
PROT UR STRIP.AUTO-MCNC: NEGATIVE MG/DL
Q ONSET: 222 MS
QRS COUNT: 8 BEATS
QRS DURATION: 90 MS
QT INTERVAL: 458 MS
QTC CALCULATION(BAZETT): 413 MS
QTC FREDERICIA: 428 MS
R AXIS: -2 DEGREES
RBC # BLD AUTO: 4.64 X10*6/UL (ref 4.5–5.9)
RBC # UR STRIP.AUTO: NEGATIVE MG/DL
SODIUM SERPL-SCNC: 133 MMOL/L (ref 136–145)
SP GR UR STRIP.AUTO: 1.01
T AXIS: 14 DEGREES
T OFFSET: 451 MS
UROBILINOGEN UR STRIP.AUTO-MCNC: NORMAL MG/DL
VENTRICULAR RATE: 49 BPM
WBC # BLD AUTO: 8.1 X10*3/UL (ref 4.4–11.3)

## 2025-03-18 PROCEDURE — 2500000004 HC RX 250 GENERAL PHARMACY W/ HCPCS (ALT 636 FOR OP/ED)

## 2025-03-18 PROCEDURE — 7100000002 HC RECOVERY ROOM TIME - EACH INCREMENTAL 1 MINUTE: Performed by: UROLOGY

## 2025-03-18 PROCEDURE — A52356 PR CYSTO/URETERO W/LITHOTRIPSY  AND INDWELL STENT INSRT: Performed by: ANESTHESIOLOGY

## 2025-03-18 PROCEDURE — 96361 HYDRATE IV INFUSION ADD-ON: CPT | Mod: 59

## 2025-03-18 PROCEDURE — 3600000003 HC OR TIME - INITIAL BASE CHARGE - PROCEDURE LEVEL THREE: Performed by: UROLOGY

## 2025-03-18 PROCEDURE — 96375 TX/PRO/DX INJ NEW DRUG ADDON: CPT | Mod: 59

## 2025-03-18 PROCEDURE — C1769 GUIDE WIRE: HCPCS | Performed by: UROLOGY

## 2025-03-18 PROCEDURE — 99285 EMERGENCY DEPT VISIT HI MDM: CPT | Mod: 25 | Performed by: STUDENT IN AN ORGANIZED HEALTH CARE EDUCATION/TRAINING PROGRAM

## 2025-03-18 PROCEDURE — 2500000004 HC RX 250 GENERAL PHARMACY W/ HCPCS (ALT 636 FOR OP/ED): Performed by: STUDENT IN AN ORGANIZED HEALTH CARE EDUCATION/TRAINING PROGRAM

## 2025-03-18 PROCEDURE — 2550000001 HC RX 255 CONTRASTS: Performed by: UROLOGY

## 2025-03-18 PROCEDURE — 74018 RADEX ABDOMEN 1 VIEW: CPT | Performed by: RADIOLOGY

## 2025-03-18 PROCEDURE — 81003 URINALYSIS AUTO W/O SCOPE: CPT | Performed by: STUDENT IN AN ORGANIZED HEALTH CARE EDUCATION/TRAINING PROGRAM

## 2025-03-18 PROCEDURE — C1894 INTRO/SHEATH, NON-LASER: HCPCS | Performed by: UROLOGY

## 2025-03-18 PROCEDURE — C2617 STENT, NON-COR, TEM W/O DEL: HCPCS | Performed by: UROLOGY

## 2025-03-18 PROCEDURE — 3600000008 HC OR TIME - EACH INCREMENTAL 1 MINUTE - PROCEDURE LEVEL THREE: Performed by: UROLOGY

## 2025-03-18 PROCEDURE — 96376 TX/PRO/DX INJ SAME DRUG ADON: CPT | Mod: 59

## 2025-03-18 PROCEDURE — 3700000002 HC GENERAL ANESTHESIA TIME - EACH INCREMENTAL 1 MINUTE: Performed by: UROLOGY

## 2025-03-18 PROCEDURE — 36415 COLL VENOUS BLD VENIPUNCTURE: CPT | Performed by: STUDENT IN AN ORGANIZED HEALTH CARE EDUCATION/TRAINING PROGRAM

## 2025-03-18 PROCEDURE — 2500000004 HC RX 250 GENERAL PHARMACY W/ HCPCS (ALT 636 FOR OP/ED): Mod: JW | Performed by: STUDENT IN AN ORGANIZED HEALTH CARE EDUCATION/TRAINING PROGRAM

## 2025-03-18 PROCEDURE — 7100000001 HC RECOVERY ROOM TIME - INITIAL BASE CHARGE: Performed by: UROLOGY

## 2025-03-18 PROCEDURE — 85025 COMPLETE CBC W/AUTO DIFF WBC: CPT | Performed by: STUDENT IN AN ORGANIZED HEALTH CARE EDUCATION/TRAINING PROGRAM

## 2025-03-18 PROCEDURE — 2500000001 HC RX 250 WO HCPCS SELF ADMINISTERED DRUGS (ALT 637 FOR MEDICARE OP): Performed by: ANESTHESIOLOGY

## 2025-03-18 PROCEDURE — 3700000001 HC GENERAL ANESTHESIA TIME - INITIAL BASE CHARGE: Performed by: UROLOGY

## 2025-03-18 PROCEDURE — 96374 THER/PROPH/DIAG INJ IV PUSH: CPT | Mod: 59

## 2025-03-18 PROCEDURE — 2500000004 HC RX 250 GENERAL PHARMACY W/ HCPCS (ALT 636 FOR OP/ED): Performed by: ANESTHESIOLOGY

## 2025-03-18 PROCEDURE — 74018 RADEX ABDOMEN 1 VIEW: CPT

## 2025-03-18 PROCEDURE — G0378 HOSPITAL OBSERVATION PER HR: HCPCS

## 2025-03-18 PROCEDURE — 2720000007 HC OR 272 NO HCPCS: Performed by: UROLOGY

## 2025-03-18 PROCEDURE — A52356 PR CYSTO/URETERO W/LITHOTRIPSY  AND INDWELL STENT INSRT: Performed by: NURSE ANESTHETIST, CERTIFIED REGISTERED

## 2025-03-18 PROCEDURE — 84075 ASSAY ALKALINE PHOSPHATASE: CPT | Performed by: STUDENT IN AN ORGANIZED HEALTH CARE EDUCATION/TRAINING PROGRAM

## 2025-03-18 PROCEDURE — 2500000004 HC RX 250 GENERAL PHARMACY W/ HCPCS (ALT 636 FOR OP/ED): Performed by: NURSE ANESTHETIST, CERTIFIED REGISTERED

## 2025-03-18 PROCEDURE — 2500000002 HC RX 250 W HCPCS SELF ADMINISTERED DRUGS (ALT 637 FOR MEDICARE OP, ALT 636 FOR OP/ED): Performed by: STUDENT IN AN ORGANIZED HEALTH CARE EDUCATION/TRAINING PROGRAM

## 2025-03-18 PROCEDURE — 2780000003 HC OR 278 NO HCPCS: Performed by: UROLOGY

## 2025-03-18 PROCEDURE — 99222 1ST HOSP IP/OBS MODERATE 55: CPT | Performed by: STUDENT IN AN ORGANIZED HEALTH CARE EDUCATION/TRAINING PROGRAM

## 2025-03-18 PROCEDURE — 2500000001 HC RX 250 WO HCPCS SELF ADMINISTERED DRUGS (ALT 637 FOR MEDICARE OP): Performed by: UROLOGY

## 2025-03-18 DEVICE — URETERAL STENT
Type: IMPLANTABLE DEVICE | Site: URETER | Status: FUNCTIONAL
Brand: PERCUFLEX™ PLUS

## 2025-03-18 RX ORDER — METOCLOPRAMIDE HYDROCHLORIDE 5 MG/ML
10 INJECTION INTRAMUSCULAR; INTRAVENOUS ONCE AS NEEDED
Status: DISCONTINUED | OUTPATIENT
Start: 2025-03-18 | End: 2025-03-18 | Stop reason: HOSPADM

## 2025-03-18 RX ORDER — ONDANSETRON 4 MG/1
4 TABLET, ORALLY DISINTEGRATING ORAL EVERY 8 HOURS PRN
Status: DISCONTINUED | OUTPATIENT
Start: 2025-03-18 | End: 2025-03-19 | Stop reason: HOSPADM

## 2025-03-18 RX ORDER — MORPHINE SULFATE 4 MG/ML
4 INJECTION, SOLUTION INTRAMUSCULAR; INTRAVENOUS ONCE
Status: COMPLETED | OUTPATIENT
Start: 2025-03-18 | End: 2025-03-18

## 2025-03-18 RX ORDER — SODIUM CHLORIDE, SODIUM LACTATE, POTASSIUM CHLORIDE, CALCIUM CHLORIDE 600; 310; 30; 20 MG/100ML; MG/100ML; MG/100ML; MG/100ML
125 INJECTION, SOLUTION INTRAVENOUS CONTINUOUS
Status: ACTIVE | OUTPATIENT
Start: 2025-03-18 | End: 2025-03-19

## 2025-03-18 RX ORDER — PROPRANOLOL HYDROCHLORIDE 20 MG/1
40 TABLET ORAL DAILY
Status: DISCONTINUED | OUTPATIENT
Start: 2025-03-18 | End: 2025-03-19 | Stop reason: HOSPADM

## 2025-03-18 RX ORDER — ACETAMINOPHEN 325 MG/1
650 TABLET ORAL EVERY 4 HOURS PRN
Status: DISCONTINUED | OUTPATIENT
Start: 2025-03-18 | End: 2025-03-19 | Stop reason: HOSPADM

## 2025-03-18 RX ORDER — PRIMIDONE 250 MG/1
125 TABLET ORAL NIGHTLY
Status: DISCONTINUED | OUTPATIENT
Start: 2025-03-18 | End: 2025-03-19 | Stop reason: HOSPADM

## 2025-03-18 RX ORDER — DIPHENHYDRAMINE HYDROCHLORIDE 50 MG/ML
12.5 INJECTION, SOLUTION INTRAMUSCULAR; INTRAVENOUS ONCE AS NEEDED
Status: DISCONTINUED | OUTPATIENT
Start: 2025-03-18 | End: 2025-03-18 | Stop reason: HOSPADM

## 2025-03-18 RX ORDER — ONDANSETRON HYDROCHLORIDE 2 MG/ML
4 INJECTION, SOLUTION INTRAVENOUS ONCE
Status: COMPLETED | OUTPATIENT
Start: 2025-03-18 | End: 2025-03-18

## 2025-03-18 RX ORDER — LABETALOL HYDROCHLORIDE 5 MG/ML
5 INJECTION, SOLUTION INTRAVENOUS
Status: DISCONTINUED | OUTPATIENT
Start: 2025-03-18 | End: 2025-03-18 | Stop reason: HOSPADM

## 2025-03-18 RX ORDER — HYDROMORPHONE HYDROCHLORIDE 1 MG/ML
0.6 INJECTION, SOLUTION INTRAMUSCULAR; INTRAVENOUS; SUBCUTANEOUS
Status: DISCONTINUED | OUTPATIENT
Start: 2025-03-18 | End: 2025-03-19 | Stop reason: HOSPADM

## 2025-03-18 RX ORDER — KETOROLAC TROMETHAMINE 15 MG/ML
15 INJECTION, SOLUTION INTRAMUSCULAR; INTRAVENOUS ONCE
Status: COMPLETED | OUTPATIENT
Start: 2025-03-18 | End: 2025-03-18

## 2025-03-18 RX ORDER — MORPHINE SULFATE 2 MG/ML
2 INJECTION, SOLUTION INTRAMUSCULAR; INTRAVENOUS
Status: DISCONTINUED | OUTPATIENT
Start: 2025-03-18 | End: 2025-03-18

## 2025-03-18 RX ORDER — FENTANYL CITRATE 50 UG/ML
INJECTION, SOLUTION INTRAMUSCULAR; INTRAVENOUS AS NEEDED
Status: DISCONTINUED | OUTPATIENT
Start: 2025-03-18 | End: 2025-03-18

## 2025-03-18 RX ORDER — CIPROFLOXACIN 2 MG/ML
400 INJECTION, SOLUTION INTRAVENOUS ONCE
Status: DISCONTINUED | OUTPATIENT
Start: 2025-03-18 | End: 2025-03-19 | Stop reason: HOSPADM

## 2025-03-18 RX ORDER — ACETAMINOPHEN 160 MG/5ML
650 SOLUTION ORAL EVERY 4 HOURS PRN
Status: DISCONTINUED | OUTPATIENT
Start: 2025-03-18 | End: 2025-03-19 | Stop reason: HOSPADM

## 2025-03-18 RX ORDER — CIPROFLOXACIN 2 MG/ML
INJECTION, SOLUTION INTRAVENOUS AS NEEDED
Status: DISCONTINUED | OUTPATIENT
Start: 2025-03-18 | End: 2025-03-18

## 2025-03-18 RX ORDER — ONDANSETRON HYDROCHLORIDE 2 MG/ML
8 INJECTION, SOLUTION INTRAVENOUS ONCE
Status: COMPLETED | OUTPATIENT
Start: 2025-03-18 | End: 2025-03-18

## 2025-03-18 RX ORDER — ACETAMINOPHEN 650 MG/1
650 SUPPOSITORY RECTAL EVERY 4 HOURS PRN
Status: DISCONTINUED | OUTPATIENT
Start: 2025-03-18 | End: 2025-03-19 | Stop reason: HOSPADM

## 2025-03-18 RX ORDER — HYDRALAZINE HYDROCHLORIDE 20 MG/ML
5 INJECTION INTRAMUSCULAR; INTRAVENOUS EVERY 30 MIN PRN
Status: DISCONTINUED | OUTPATIENT
Start: 2025-03-18 | End: 2025-03-18 | Stop reason: HOSPADM

## 2025-03-18 RX ORDER — HYDROMORPHONE HYDROCHLORIDE 1 MG/ML
1 INJECTION, SOLUTION INTRAMUSCULAR; INTRAVENOUS; SUBCUTANEOUS EVERY 5 MIN PRN
Status: DISCONTINUED | OUTPATIENT
Start: 2025-03-18 | End: 2025-03-18 | Stop reason: HOSPADM

## 2025-03-18 RX ORDER — HYDROCODONE BITARTRATE AND ACETAMINOPHEN 5; 325 MG/1; MG/1
1 TABLET ORAL EVERY 4 HOURS PRN
Status: DISCONTINUED | OUTPATIENT
Start: 2025-03-18 | End: 2025-03-18 | Stop reason: HOSPADM

## 2025-03-18 RX ORDER — VIT C/E/ZN/COPPR/LUTEIN/ZEAXAN 250MG-90MG
25 CAPSULE ORAL DAILY
COMMUNITY

## 2025-03-18 RX ORDER — PROPOFOL 10 MG/ML
INJECTION, EMULSION INTRAVENOUS AS NEEDED
Status: DISCONTINUED | OUTPATIENT
Start: 2025-03-18 | End: 2025-03-18

## 2025-03-18 RX ORDER — MORPHINE SULFATE 4 MG/ML
4 INJECTION, SOLUTION INTRAMUSCULAR; INTRAVENOUS EVERY 4 HOURS PRN
Status: DISCONTINUED | OUTPATIENT
Start: 2025-03-18 | End: 2025-03-18

## 2025-03-18 RX ORDER — TAMSULOSIN HYDROCHLORIDE 0.4 MG/1
0.4 CAPSULE ORAL DAILY
Status: DISCONTINUED | OUTPATIENT
Start: 2025-03-18 | End: 2025-03-19 | Stop reason: HOSPADM

## 2025-03-18 RX ORDER — ONDANSETRON HYDROCHLORIDE 2 MG/ML
4 INJECTION, SOLUTION INTRAVENOUS EVERY 8 HOURS PRN
Status: DISCONTINUED | OUTPATIENT
Start: 2025-03-18 | End: 2025-03-19 | Stop reason: HOSPADM

## 2025-03-18 RX ORDER — POLYETHYLENE GLYCOL 3350 17 G/17G
17 POWDER, FOR SOLUTION ORAL DAILY PRN
Status: DISCONTINUED | OUTPATIENT
Start: 2025-03-18 | End: 2025-03-19 | Stop reason: HOSPADM

## 2025-03-18 RX ORDER — IBUPROFEN 100 MG/5ML
1000 SUSPENSION, ORAL (FINAL DOSE FORM) ORAL DAILY
COMMUNITY

## 2025-03-18 RX ORDER — SODIUM CHLORIDE, SODIUM LACTATE, POTASSIUM CHLORIDE, CALCIUM CHLORIDE 600; 310; 30; 20 MG/100ML; MG/100ML; MG/100ML; MG/100ML
100 INJECTION, SOLUTION INTRAVENOUS CONTINUOUS
Status: DISCONTINUED | OUTPATIENT
Start: 2025-03-18 | End: 2025-03-18 | Stop reason: HOSPADM

## 2025-03-18 RX ORDER — MIDAZOLAM HYDROCHLORIDE 1 MG/ML
1 INJECTION, SOLUTION INTRAMUSCULAR; INTRAVENOUS ONCE AS NEEDED
Status: DISCONTINUED | OUTPATIENT
Start: 2025-03-18 | End: 2025-03-18 | Stop reason: HOSPADM

## 2025-03-18 RX ORDER — PHENYLEPHRINE HCL IN 0.9% NACL 1 MG/10 ML
SYRINGE (ML) INTRAVENOUS AS NEEDED
Status: DISCONTINUED | OUTPATIENT
Start: 2025-03-18 | End: 2025-03-18

## 2025-03-18 RX ORDER — ALBUTEROL SULFATE 0.83 MG/ML
2.5 SOLUTION RESPIRATORY (INHALATION)
Status: DISCONTINUED | OUTPATIENT
Start: 2025-03-18 | End: 2025-03-18 | Stop reason: HOSPADM

## 2025-03-18 RX ORDER — LIDOCAINE HYDROCHLORIDE 20 MG/ML
INJECTION, SOLUTION INFILTRATION; PERINEURAL AS NEEDED
Status: DISCONTINUED | OUTPATIENT
Start: 2025-03-18 | End: 2025-03-18

## 2025-03-18 RX ADMIN — SODIUM CHLORIDE, POTASSIUM CHLORIDE, SODIUM LACTATE AND CALCIUM CHLORIDE 125 ML/HR: 600; 310; 30; 20 INJECTION, SOLUTION INTRAVENOUS at 09:30

## 2025-03-18 RX ADMIN — MORPHINE SULFATE 2 MG: 2 INJECTION, SOLUTION INTRAMUSCULAR; INTRAVENOUS at 09:30

## 2025-03-18 RX ADMIN — PROPRANOLOL HYDROCHLORIDE 40 MG: 20 TABLET ORAL at 09:30

## 2025-03-18 RX ADMIN — HYDROCODONE BITARTRATE AND ACETAMINOPHEN 1 TABLET: 5; 325 TABLET ORAL at 18:09

## 2025-03-18 RX ADMIN — LIDOCAINE HYDROCHLORIDE 80 MG: 20 INJECTION, SOLUTION INFILTRATION; PERINEURAL at 16:38

## 2025-03-18 RX ADMIN — TAMSULOSIN HYDROCHLORIDE 0.4 MG: 0.4 CAPSULE ORAL at 09:30

## 2025-03-18 RX ADMIN — Medication 200 MCG: at 16:57

## 2025-03-18 RX ADMIN — PRIMIDONE 125 MG: 250 TABLET ORAL at 20:13

## 2025-03-18 RX ADMIN — PROPOFOL 200 MG: 10 INJECTION, EMULSION INTRAVENOUS at 16:37

## 2025-03-18 RX ADMIN — ONDANSETRON 4 MG: 2 INJECTION INTRAMUSCULAR; INTRAVENOUS at 02:05

## 2025-03-18 RX ADMIN — ONDANSETRON 8 MG: 2 INJECTION INTRAMUSCULAR; INTRAVENOUS at 14:42

## 2025-03-18 RX ADMIN — FENTANYL CITRATE 50 MCG: 50 INJECTION, SOLUTION INTRAMUSCULAR; INTRAVENOUS at 16:38

## 2025-03-18 RX ADMIN — CIPROFLOXACIN 400 MG: 400 INJECTION, SOLUTION INTRAVENOUS at 16:46

## 2025-03-18 RX ADMIN — SODIUM CHLORIDE, POTASSIUM CHLORIDE, SODIUM LACTATE AND CALCIUM CHLORIDE 125 ML/HR: 600; 310; 30; 20 INJECTION, SOLUTION INTRAVENOUS at 18:33

## 2025-03-18 RX ADMIN — Medication 100 MCG: at 17:08

## 2025-03-18 RX ADMIN — FENTANYL CITRATE 50 MCG: 50 INJECTION, SOLUTION INTRAMUSCULAR; INTRAVENOUS at 17:06

## 2025-03-18 RX ADMIN — MORPHINE SULFATE 4 MG: 4 INJECTION, SOLUTION INTRAMUSCULAR; INTRAVENOUS at 02:06

## 2025-03-18 RX ADMIN — HYDROMORPHONE HYDROCHLORIDE 0.6 MG: 1 INJECTION, SOLUTION INTRAMUSCULAR; INTRAVENOUS; SUBCUTANEOUS at 13:05

## 2025-03-18 RX ADMIN — SODIUM CHLORIDE, SODIUM LACTATE, POTASSIUM CHLORIDE, AND CALCIUM CHLORIDE 1000 ML: .6; .31; .03; .02 INJECTION, SOLUTION INTRAVENOUS at 02:23

## 2025-03-18 RX ADMIN — SODIUM CHLORIDE, POTASSIUM CHLORIDE, SODIUM LACTATE AND CALCIUM CHLORIDE: 600; 310; 30; 20 INJECTION, SOLUTION INTRAVENOUS at 16:33

## 2025-03-18 RX ADMIN — KETOROLAC TROMETHAMINE 15 MG: 15 INJECTION, SOLUTION INTRAMUSCULAR; INTRAVENOUS at 02:06

## 2025-03-18 SDOH — HEALTH STABILITY: MENTAL HEALTH: HOW OFTEN DO YOU HAVE A DRINK CONTAINING ALCOHOL?: NEVER

## 2025-03-18 SDOH — SOCIAL STABILITY: SOCIAL INSECURITY: HAVE YOU HAD THOUGHTS OF HARMING ANYONE ELSE?: NO

## 2025-03-18 SDOH — SOCIAL STABILITY: SOCIAL INSECURITY: DOES ANYONE TRY TO KEEP YOU FROM HAVING/CONTACTING OTHER FRIENDS OR DOING THINGS OUTSIDE YOUR HOME?: NO

## 2025-03-18 SDOH — ECONOMIC STABILITY: FOOD INSECURITY: WITHIN THE PAST 12 MONTHS, THE FOOD YOU BOUGHT JUST DIDN'T LAST AND YOU DIDN'T HAVE MONEY TO GET MORE.: NEVER TRUE

## 2025-03-18 SDOH — SOCIAL STABILITY: SOCIAL INSECURITY: WITHIN THE LAST YEAR, HAVE YOU BEEN HUMILIATED OR EMOTIONALLY ABUSED IN OTHER WAYS BY YOUR PARTNER OR EX-PARTNER?: NO

## 2025-03-18 SDOH — ECONOMIC STABILITY: INCOME INSECURITY: IN THE PAST 12 MONTHS HAS THE ELECTRIC, GAS, OIL, OR WATER COMPANY THREATENED TO SHUT OFF SERVICES IN YOUR HOME?: NO

## 2025-03-18 SDOH — SOCIAL STABILITY: SOCIAL INSECURITY
WITHIN THE LAST YEAR, HAVE YOU BEEN RAPED OR FORCED TO HAVE ANY KIND OF SEXUAL ACTIVITY BY YOUR PARTNER OR EX-PARTNER?: NO

## 2025-03-18 SDOH — SOCIAL STABILITY: SOCIAL INSECURITY: HAS ANYONE EVER THREATENED TO HURT YOUR FAMILY OR YOUR PETS?: NO

## 2025-03-18 SDOH — ECONOMIC STABILITY: FOOD INSECURITY: WITHIN THE PAST 12 MONTHS, YOU WORRIED THAT YOUR FOOD WOULD RUN OUT BEFORE YOU GOT THE MONEY TO BUY MORE.: NEVER TRUE

## 2025-03-18 SDOH — HEALTH STABILITY: MENTAL HEALTH: HOW OFTEN DO YOU HAVE SIX OR MORE DRINKS ON ONE OCCASION?: NEVER

## 2025-03-18 SDOH — SOCIAL STABILITY: SOCIAL INSECURITY: WERE YOU ABLE TO COMPLETE ALL THE BEHAVIORAL HEALTH SCREENINGS?: YES

## 2025-03-18 SDOH — SOCIAL STABILITY: SOCIAL INSECURITY: DO YOU FEEL ANYONE HAS EXPLOITED OR TAKEN ADVANTAGE OF YOU FINANCIALLY OR OF YOUR PERSONAL PROPERTY?: NO

## 2025-03-18 SDOH — HEALTH STABILITY: MENTAL HEALTH: HOW MANY DRINKS CONTAINING ALCOHOL DO YOU HAVE ON A TYPICAL DAY WHEN YOU ARE DRINKING?: PATIENT DOES NOT DRINK

## 2025-03-18 SDOH — SOCIAL STABILITY: SOCIAL INSECURITY: ARE THERE ANY APPARENT SIGNS OF INJURIES/BEHAVIORS THAT COULD BE RELATED TO ABUSE/NEGLECT?: NO

## 2025-03-18 SDOH — SOCIAL STABILITY: SOCIAL INSECURITY
WITHIN THE LAST YEAR, HAVE YOU BEEN KICKED, HIT, SLAPPED, OR OTHERWISE PHYSICALLY HURT BY YOUR PARTNER OR EX-PARTNER?: NO

## 2025-03-18 SDOH — SOCIAL STABILITY: SOCIAL INSECURITY: WITHIN THE LAST YEAR, HAVE YOU BEEN AFRAID OF YOUR PARTNER OR EX-PARTNER?: NO

## 2025-03-18 SDOH — HEALTH STABILITY: MENTAL HEALTH: CURRENT SMOKER: 0

## 2025-03-18 SDOH — SOCIAL STABILITY: SOCIAL INSECURITY: DO YOU FEEL UNSAFE GOING BACK TO THE PLACE WHERE YOU ARE LIVING?: NO

## 2025-03-18 SDOH — SOCIAL STABILITY: SOCIAL INSECURITY: HAVE YOU HAD ANY THOUGHTS OF HARMING ANYONE ELSE?: NO

## 2025-03-18 SDOH — SOCIAL STABILITY: SOCIAL INSECURITY: ABUSE: ADULT

## 2025-03-18 SDOH — SOCIAL STABILITY: SOCIAL INSECURITY: ARE YOU OR HAVE YOU BEEN THREATENED OR ABUSED PHYSICALLY, EMOTIONALLY, OR SEXUALLY BY ANYONE?: NO

## 2025-03-18 ASSESSMENT — COGNITIVE AND FUNCTIONAL STATUS - GENERAL
MOBILITY SCORE: 21
PATIENT BASELINE BEDBOUND: NO
DAILY ACTIVITIY SCORE: 24
DAILY ACTIVITIY SCORE: 24
WALKING IN HOSPITAL ROOM: A LITTLE
MOBILITY SCORE: 24
MOVING TO AND FROM BED TO CHAIR: A LITTLE
CLIMB 3 TO 5 STEPS WITH RAILING: A LITTLE

## 2025-03-18 ASSESSMENT — PATIENT HEALTH QUESTIONNAIRE - PHQ9
1. LITTLE INTEREST OR PLEASURE IN DOING THINGS: NOT AT ALL
SUM OF ALL RESPONSES TO PHQ9 QUESTIONS 1 & 2: 0
2. FEELING DOWN, DEPRESSED OR HOPELESS: NOT AT ALL

## 2025-03-18 ASSESSMENT — PAIN SCALES - GENERAL
PAINLEVEL_OUTOF10: 0 - NO PAIN
PAINLEVEL_OUTOF10: 2
PAINLEVEL_OUTOF10: 8
PAINLEVEL_OUTOF10: 4
PAINLEVEL_OUTOF10: 6
PAINLEVEL_OUTOF10: 4
PAINLEVEL_OUTOF10: 0 - NO PAIN
PAINLEVEL_OUTOF10: 3
PAINLEVEL_OUTOF10: 5 - MODERATE PAIN
PAINLEVEL_OUTOF10: 9

## 2025-03-18 ASSESSMENT — ACTIVITIES OF DAILY LIVING (ADL)
PATIENT'S MEMORY ADEQUATE TO SAFELY COMPLETE DAILY ACTIVITIES?: YES
HEARING - LEFT EAR: DIFFICULTY WITH NOISE
ADEQUATE_TO_COMPLETE_ADL: YES
JUDGMENT_ADEQUATE_SAFELY_COMPLETE_DAILY_ACTIVITIES: YES
TOILETING: INDEPENDENT
HEARING - RIGHT EAR: DIFFICULTY WITH NOISE
LACK_OF_TRANSPORTATION: NO
GROOMING: INDEPENDENT
WALKS IN HOME: NEEDS ASSISTANCE
BATHING: INDEPENDENT
FEEDING YOURSELF: INDEPENDENT
DRESSING YOURSELF: INDEPENDENT

## 2025-03-18 ASSESSMENT — LIFESTYLE VARIABLES
SKIP TO QUESTIONS 9-10: 1
AUDIT-C TOTAL SCORE: 0

## 2025-03-18 ASSESSMENT — PAIN DESCRIPTION - LOCATION
LOCATION: ABDOMEN

## 2025-03-18 ASSESSMENT — PAIN DESCRIPTION - ORIENTATION
ORIENTATION: LEFT
ORIENTATION: LEFT;LOWER
ORIENTATION: LEFT

## 2025-03-18 ASSESSMENT — PAIN DESCRIPTION - DIRECTION: RADIATING_TOWARDS: LOWER BACK

## 2025-03-18 ASSESSMENT — PAIN DESCRIPTION - ONSET: ONSET: SUDDEN

## 2025-03-18 ASSESSMENT — COLUMBIA-SUICIDE SEVERITY RATING SCALE - C-SSRS
1. IN THE PAST MONTH, HAVE YOU WISHED YOU WERE DEAD OR WISHED YOU COULD GO TO SLEEP AND NOT WAKE UP?: NO
6. HAVE YOU EVER DONE ANYTHING, STARTED TO DO ANYTHING, OR PREPARED TO DO ANYTHING TO END YOUR LIFE?: NO
2. HAVE YOU ACTUALLY HAD ANY THOUGHTS OF KILLING YOURSELF?: NO

## 2025-03-18 ASSESSMENT — PAIN DESCRIPTION - PAIN TYPE: TYPE: ACUTE PAIN

## 2025-03-18 ASSESSMENT — PAIN DESCRIPTION - PROGRESSION: CLINICAL_PROGRESSION: RAPIDLY WORSENING

## 2025-03-18 ASSESSMENT — PAIN DESCRIPTION - FREQUENCY: FREQUENCY: CONSTANT/CONTINUOUS

## 2025-03-18 ASSESSMENT — PAIN DESCRIPTION - DESCRIPTORS: DESCRIPTORS: DULL

## 2025-03-18 ASSESSMENT — PAIN - FUNCTIONAL ASSESSMENT: PAIN_FUNCTIONAL_ASSESSMENT: 0-10

## 2025-03-18 NOTE — ANESTHESIA PREPROCEDURE EVALUATION
Patient: Juvenal Bagley Jr.    Procedure Information       Date/Time: 03/18/25 0980    Procedure: CYSTOSCOPY, WITH URETERAL STENT INSERTION (Left)    Location: [facility] OR 05 / [facility]    Surgeons: Juvenal Ribeiro MD            Relevant Problems   Cardiac   (+) Abnormal EKG   (+) Benign essential hypertension   (+) Mixed hyperlipidemia   (+) Nonrheumatic aortic valve insufficiency      /Renal   (+) Kidney stone      HEENT   (+) Hearing loss      Skin   (+) Eczema       Clinical information reviewed:   Tobacco  Allergies  Meds   Med Hx  Surg Hx   Fam Hx  Soc Hx        NPO Detail:  No data recorded     Physical Exam    Airway  Mallampati: II  TM distance: >3 FB  Neck ROM: full     Cardiovascular   Rhythm: regular  Rate: normal     Dental   Comments: Multiple implants   Pulmonary   Breath sounds clear to auscultation     Abdominal - normal exam             Anesthesia Plan    History of general anesthesia?: yes  History of complications of general anesthesia?: no    ASA 3     general     The patient is not a current smoker.    intravenous induction   Anesthetic plan and risks discussed with patient.    Plan discussed with CRNA.

## 2025-03-18 NOTE — PROGRESS NOTES
Emergency Medicine Transition of Care Note.    I received Juvenal Bagley Jr. in signout from Dr. Gonzalez.  Please see the previous ED provider note for all HPI, PE and MDM up to the time of signout at 0700. This is in addition to the primary record.    In brief Juvenal Bagley Jr. is an 65 y.o. male presenting for   Chief Complaint   Patient presents with   • Flank Pain     Pt presents to ED with c/o left flank pain that radiates to lower back, was dx with a 6mm kidney stone a few days ago, pain is worsening      At the time of signout we were awaiting: Handoff to hospitalist at [facility] for admission.    ED Course as of 03/18/25 1702   Tue Mar 18, 2025   0624 Patient discussed with Dr. Mckoy of [facility] urology [facility] via the transfer center who was able to call the on-call number and connect us.  He stated that the patient's primary urologist Dr. Talley is a part of the same group that was able to see him at [facility] and agreed with appropriateness of transfer.  Still following with transfer center for bed availability and to give report to an accepting hospitalist at [facility].  Per group chat via direct message with the nursing manager Clarita at [facility], they plan to call us at 8 to 9 AM when I discharge is performed in order to give a report to the hospitalist group and cannot find a hospitalist until there is a bed available for us to give report to. [DS]   0801 Discussed with the patient, he states he does not want go to [facility], if there is a bed he is totally fine staying here and seeing a urologist here.  We called Dr. Aguillon who states he can see the patient here for possible stenting today.  Patient will be admitted to hospitalist service Dr. Green.   [GR]      ED Course User Index  [DS] Javi Bray MD  [GR] Long Ro DO         Diagnoses as of 03/18/25 1702   Kidney stone   Uncontrolled pain   PORTER (acute kidney injury) (CMS-[facility])       Medical Decision Making      Final diagnoses:    [N20.0] Kidney stone   [R52] Uncontrolled pain   [N17.9] PORTER (acute kidney injury) (CMS-[facility])           Procedure  Procedures    Alfonso Cordova MD    Richelle

## 2025-03-18 NOTE — ED NOTES
0808 Left voicemail message on [facility] RN Supervisor     Stating patient will be admitted to [facility] and to cancel transfer to [facility]     Joselyn Mercado, EMT  03/18/25 0809

## 2025-03-18 NOTE — ANESTHESIA PROCEDURE NOTES
Airway  Date/Time: 3/18/2025 4:45 PM  Urgency: elective    Airway not difficult    Staffing  Performed: CRNA   Authorized by: Todd Valdes MD    Performed by: JUSTO Rodriguez-ELVIE  Patient location during procedure: OR    Indications and Patient Condition  Indications for airway management: anesthesia  Spontaneous ventilation: present  Sedation level: deep  Preoxygenated: yes  Patient position: sniffing  MILS maintained throughout  Mask difficulty assessment: 1 - vent by mask  No planned trial extubation    Final Airway Details  Final airway type: supraglottic airway      Successful airway: classic  Size 4     Number of attempts at approach: 1  Ventilation between attempts: none  Number of other approaches attempted: 0

## 2025-03-18 NOTE — ED PROVIDER NOTES
EMERGENCY DEPARTMENT ENCOUNTER      Pt Name: Juvenal Bagley Jr.  MRN: [id number]  Birthdate 1960  Date of evaluation: 3/18/2025  Provider: Santosh Gonzalez MD    CHIEF COMPLAINT       Chief Complaint   Patient presents with    Flank Pain     Pt presents to ED with c/o left flank pain that radiates to lower back, was dx with a 6mm kidney stone a few days ago, pain is worsening          HISTORY OF PRESENT ILLNESS    HPI  Patient is a 65-year-old male with a history of kidney stones, HTN, HLD presenting with left flank pain.  This has been ongoing for the past few days.  He was here on the 13th, diagnosed with a obstructing ureteral stone at side.  Was sent home with multimodal pain control.  He saw his urologist yesterday they are planning for lithotripsy next week.  However his pain has been uncontrolled.  Pain is 10 test 10, rating down to the groin, constant, without consistently being exacerbating factors he notes nausea without vomiting he denies fever, sweats, chills, chest pain, shortness of breath, diarrhea, constipation, black or bloody stools, dysuria, hematuria.  He is still urinating.    Nursing Notes were reviewed.    PAST MEDICAL HISTORY     Past Medical History:   Diagnosis Date    Left hip pain 03/29/2023    Personal history of other diseases of the circulatory system     History of pericarditis    Personal history of other diseases of the nervous system and sense organs     History of sleep apnea    Primary osteoarthritis of left hip 03/29/2023         SURGICAL HISTORY       Past Surgical History:   Procedure Laterality Date    APPENDECTOMY  04/10/2015    Appendectomy    OTHER SURGICAL HISTORY  04/10/2015    Venous Ligation With Stripping    OTHER SURGICAL HISTORY  01/07/2019    Hand surgery    TOTAL HIP ARTHROPLASTY Left 12/15/2022         CURRENT MEDICATIONS       Current Discharge Medication List        CONTINUE these medications which have NOT CHANGED    Details   ascorbic acid (Vitamin C) 1,000  mg tablet Take 1 tablet (1,000 mg) by mouth once daily.      cholecalciferol (Vitamin D-3) 25 MCG (1000 UT) capsule Take 1 capsule (25 mcg) by mouth once daily.      glucosamine-chondroitin 250-200 mg tablet Take 1 tablet by mouth 2 times a day.      ibuprofen 600 mg tablet Take 1 tablet (600 mg) by mouth every 6 hours if needed for mild pain (1 - 3) for up to 7 days.  Qty: 28 tablet, Refills: 0    Associated Diagnoses: Hydronephrosis with ureteropelvic junction (UPJ) obstruction      lisinopril 40 mg tablet Take 1 tablet (40 mg) by mouth once daily.  Qty: 90 tablet, Refills: 3    Associated Diagnoses: Hypertension, unspecified type      multivitamin tablet Take 1 tablet by mouth once daily.      oxyCODONE (Roxicodone) 5 mg immediate release tablet Take 1 tablet (5 mg) by mouth every 6 hours if needed for severe pain (7 - 10) for up to 3 days.  Qty: 12 tablet, Refills: 0    Associated Diagnoses: Hydronephrosis with ureteropelvic junction (UPJ) obstruction      primidone (Mysoline) 50 mg tablet Take 2.5 tablets (125 mg) by mouth once daily at bedtime.  Qty: 225 tablet, Refills: 3    Associated Diagnoses: Essential tremor      tamsulosin (Flomax) 0.4 mg 24 hr capsule Take 1 capsule (0.4 mg) by mouth once daily for 10 days.  Qty: 10 capsule, Refills: 0    Associated Diagnoses: Hydronephrosis with ureteropelvic junction (UPJ) obstruction      propranolol (Inderal) 20 mg tablet Take 1 tablet (20 mg) by mouth 2 times a day.  Qty: 180 tablet, Refills: 3    Associated Diagnoses: Essential tremor             ALLERGIES     Versed [midazolam], Ceclor [cefaclor], Doxycycline, and Sulfa (sulfonamide antibiotics)    FAMILY HISTORY       Family History   Problem Relation Name Age of Onset    Dementia Mother      Breast cancer Mother      Heart attack Mother      Valvular heart disease Mother      Heart attack Father            SOCIAL HISTORY       Social History     Socioeconomic History    Marital status:    Tobacco Use     Smoking status: Never    Smokeless tobacco: Never   Substance and Sexual Activity    Alcohol use: Not Currently    Drug use: Never    Sexual activity: Yes     Partners: Female     Birth control/protection: I.U.D., None     Social Drivers of Health     Financial Resource Strain: Low Risk  (3/18/2025)    Overall Financial Resource Strain (CARDIA)     Difficulty of Paying Living Expenses: Not hard at all   Food Insecurity: No Food Insecurity (3/18/2025)    Hunger Vital Sign     Worried About Running Out of Food in the Last Year: Never true     Ran Out of Food in the Last Year: Never true   Transportation Needs: No Transportation Needs (3/18/2025)    PRAPARE - Transportation     Lack of Transportation (Medical): No     Lack of Transportation (Non-Medical): No   Intimate Partner Violence: Not At Risk (3/18/2025)    Humiliation, Afraid, Rape, and Kick questionnaire     Fear of Current or Ex-Partner: No     Emotionally Abused: No     Physically Abused: No     Sexually Abused: No   Housing Stability: Low Risk  (3/18/2025)    Housing Stability Vital Sign     Unable to Pay for Housing in the Last Year: No     Number of Times Moved in the Last Year: 0     Homeless in the Last Year: No       SCREENINGS                        PHYSICAL EXAM    (up to 7 for level 4, 8 or more for level 5)     ED Triage Vitals [03/18/25 0113]   Temperature Heart Rate Respirations BP   36.3 °C (97.3 °F) 71 18 (!) 196/94      Pulse Ox Temp Source Heart Rate Source Patient Position   100 % Temporal Monitor Sitting      BP Location FiO2 (%)     Right arm --       Physical Exam  Constitutional:       Appearance: He is not toxic-appearing.      Comments: Appears uncomfortable   HENT:      Head: Normocephalic and atraumatic.      Nose: Nose normal.      Mouth/Throat:      Mouth: Mucous membranes are moist.      Pharynx: Oropharynx is clear.   Eyes:      Extraocular Movements: Extraocular movements intact.      Conjunctiva/sclera: Conjunctivae normal.    Cardiovascular:      Rate and Rhythm: Normal rate and regular rhythm.      Heart sounds: Normal heart sounds.   Pulmonary:      Effort: Pulmonary effort is normal. No respiratory distress.      Breath sounds: Normal breath sounds.   Abdominal:      General: There is no distension.      Palpations: Abdomen is soft.      Tenderness: There is no abdominal tenderness. There is left CVA tenderness. There is no right CVA tenderness, guarding or rebound.   Musculoskeletal:         General: No deformity or signs of injury.      Cervical back: Normal range of motion and neck supple.      Right lower leg: No edema.      Left lower leg: No edema.   Skin:     General: Skin is warm and dry.   Neurological:      General: No focal deficit present.          DIAGNOSTIC RESULTS     LABS:  Labs Reviewed   COMPREHENSIVE METABOLIC PANEL - Abnormal       Result Value    Glucose 129 (*)     Sodium 133 (*)     Potassium 4.5      Chloride 102      Bicarbonate 20 (*)     Anion Gap 16      Urea Nitrogen 28 (*)     Creatinine 2.23 (*)     eGFR 32 (*)     Calcium 9.2      Albumin 4.4      Alkaline Phosphatase 65      Total Protein 7.4      AST 25      Bilirubin, Total 0.6      ALT 18     URINALYSIS WITH REFLEX CULTURE AND MICROSCOPIC - Normal    Color, Urine Light-Yellow      Appearance, Urine Clear      Specific Gravity, Urine 1.015      pH, Urine 5.5      Protein, Urine NEGATIVE      Glucose, Urine Normal      Blood, Urine NEGATIVE      Ketones, Urine NEGATIVE      Bilirubin, Urine NEGATIVE      Urobilinogen, Urine Normal      Nitrite, Urine NEGATIVE      Leukocyte Esterase, Urine NEGATIVE     CBC WITH AUTO DIFFERENTIAL    WBC 8.1      nRBC 0.0      RBC 4.64      Hemoglobin 14.7      Hematocrit 45.8      MCV 99      MCH 31.7      MCHC 32.1      RDW 12.5      Platelets 204      Neutrophils % 63.9      Immature Granulocytes %, Automated 0.6      Lymphocytes % 20.1      Monocytes % 12.0      Eosinophils % 2.8      Basophils % 0.6       Neutrophils Absolute 5.17      Immature Granulocytes Absolute, Automated 0.05      Lymphocytes Absolute 1.63      Monocytes Absolute 0.97      Eosinophils Absolute 0.23      Basophils Absolute 0.05     URINALYSIS WITH REFLEX CULTURE AND MICROSCOPIC    Narrative:     The following orders were created for panel order Urinalysis with Reflex Culture and Microscopic.  Procedure                               Abnormality         Status                     ---------                               -----------         ------                     Urinalysis with Reflex C...[[id number]]  Normal              Final result               Extra Urine Gray Tube[[id number]]                            Final result                 Please view results for these tests on the individual orders.   EXTRA URINE GRAY TUBE    Extra Tube Hold for add-ons.     CBC   RENAL FUNCTION PANEL   MAGNESIUM       All other labs were within normal range or not returned as of this dictation.    Imaging  FL fluoro images no charge   Final Result      XR abdomen 1 view   Final Result   8 mm proximal left ureteral calculus is in similar position to CT 5   days ago.        MACRO:   None        Signed by: Chris Thompson 3/18/2025 11:53 AM   Dictation workstation:   [id number]           Procedures  Procedures     EMERGENCY DEPARTMENT COURSE/MDM:     ED Course as of 03/19/25 0007   Tue Mar 18, 2025   0624 Patient discussed with Dr. Mckoy of [facility] urology [facility] via the transfer center who was able to call the on-call number and connect us.  He stated that the patient's primary urologist Dr. Talley is a part of the same group that was able to see him at [facility] and agreed with appropriateness of transfer.  Still following with transfer center for bed availability and to give report to an accepting hospitalist at [facility].  Per group chat via direct message with the nursing manager Clarita at [facility], they plan to call us at 8 to 9 AM when I discharge is performed  in order to give a report to the hospitalist group and cannot find a hospitalist until there is a bed available for us to give report to. [DS]   0801 Discussed with the patient, he states he does not want go to [facility], if there is a bed he is totally fine staying here and seeing a urologist here.  We called Dr. Aguillon who states he can see the patient here for possible stenting today.  Patient will be admitted to hospitalist service Dr. Green.   [GR]      ED Course User Index  [DS] Javi Bray MD  [GR] Long Ro DO         Diagnoses as of 03/19/25 0007   Kidney stone   Uncontrolled pain   PORTER (acute kidney injury) (CMS-[facility])        Medical Decision Making  History obtained from the patient.  Records including labs, imaging, notes independently reviewed by me.  Presentation consistent with patient's known kidney stone.  He had a repeated x-ray performed recently which demonstrated no progress in the movement.  Patient is scheduled to have a lithotripsy with his urologist at [facility], Dr. Talley in 1 week.  We opted not to perform a repeat CT scan at this time due to these known facts.  Basic labs and a urinalysis were obtained.  Urinalysis without evidence of UTI.  CBC unremarkable.  CMP notable for a significant PORTER with a creatinine of 2.23, GFR 32, bicarb decreased to 20, hyponatremia of 133.  Patient given a liter fluid bolus.  He was also given Toradol, morphine, Zofran with improvement but not resolution of his symptoms.  Given the fact that the patient sees [facility] urology rather than  or the private group to cover [facility], we spent a significant amount of time attempting to contact the transfer center to arrange for transport for further evaluation at [facility].  We were informed by the transfer center that they do not take conference calls until a bed is available.  We also attempted numerous times to contact their urology group with some degree of difficulty.  We are  eventually able to get through to one of the patient's urologist covering partners who was on-call who said that he would be available for consult.  We are informed that the hospital medicine service would be contacting us sometime between 8 and 9 AM to discuss transfer and admission of the patient.  This was signed out to the oncoming provider at shift change in the AM.    Patient and or family in agreement and understanding of treatment plan.  All questions answered.      I reviewed the case with the attending ED physician. The attending ED physician agrees with the plan. Patient and/or patient´s representative was counseled regarding labs, imaging, likely diagnosis, and plan. All questions were answered.    ED Medications administered this visit:    Medications   lactated Ringer's infusion (125 mL/hr intravenous Rate/Dose Verify 3/18/25 2118)   primidone (Mysoline) tablet 125 mg (125 mg oral Given 3/18/25 2013)   propranolol (Inderal) tablet 40 mg (40 mg oral Given 3/18/25 0930)   tamsulosin (Flomax) 24 hr capsule 0.4 mg (0.4 mg oral Given 3/18/25 0930)   acetaminophen (Tylenol) tablet 650 mg (has no administration in time range)     Or   acetaminophen (Tylenol) oral liquid 650 mg (has no administration in time range)     Or   acetaminophen (Tylenol) suppository 650 mg (has no administration in time range)   ondansetron ODT (Zofran-ODT) disintegrating tablet 4 mg ( oral See Alternative 3/18/25 1832)     Or   ondansetron (Zofran) injection 4 mg (4 mg intravenous Not Given 3/18/25 1832)   polyethylene glycol (Glycolax, Miralax) packet 17 g (has no administration in time range)   HYDROmorphone (Dilaudid) injection 0.4 mg ( intravenous MAR Unhold 3/18/25 1832)   HYDROmorphone (Dilaudid) injection 0.6 mg ( intravenous MAR Unhold 3/18/25 1832)   ciprofloxacin (Cipro) 400 mg in dextrose 5%  mL (400 mg intravenous Not Given 3/18/25 1834)   ketorolac (Toradol) injection 15 mg (15 mg intravenous Given 3/18/25 0206)    ondansetron (Zofran) injection 4 mg (4 mg intravenous Given 3/18/25 0205)   morphine injection 4 mg (4 mg intravenous Given 3/18/25 0206)   lactated Ringer's bolus 1,000 mL (0 mL intravenous Stopped 3/18/25 0325)   ondansetron (Zofran) injection 8 mg (8 mg intravenous Given 3/18/25 1442)       New Prescriptions from this visit:    Current Discharge Medication List          Follow-up:  No follow-up provider specified.      Final Impression:   1. Kidney stone    2. Uncontrolled pain    3. PORTER (acute kidney injury) (CMS-HCC)          (Please note that portions of this note were completed with a voice recognition program.  Efforts were made to edit the dictations but occasionally words are mis-transcribed.)     Santosh Gonzalez MD  Resident  03/18/25 0636       Santosh Gonzalez MD  Resident  03/18/25 0646       Santosh Gonzalez MD  Resident  03/19/25 0007

## 2025-03-18 NOTE — H&P
Medical Group History and Physical  ASSESSMENT & PLAN:   Juvenal Bagley Jr. is a 65 y.o. male admitted to [facility] for management of:    Nephrolithiasis  Mild left hydronephrosis  PORTER 2/2 above  -X-ray of abdomen revealed 8 mm proximal left ureteral calculus is in similar position to CT 5 days ago  - sCr 2.23 (baseline ~1.0)  - renally dose medications, strict I/Os  - Pain control regimen with prn Tylenol & prn IV Dilaudid  - Urology consulted, recommendations pending    VTE Prophylaxis: None    Disposition: Anticipated LOS < 2 days.     I spent a total of 65 minutes on the date of the service which included preparing to see the patient, face-to-face patient care, completing clinical documentation, obtaining and/or reviewing separately obtained history, performing a medically appropriate examination, counseling and educating the patient/family/caregiver, ordering medications, tests, or procedures, independently interpreting results (not separately reported), and communicating results to the patient/family/caregiver.     Jules Green, [facility]    HISTORY OF PRESENT ILLNESS:   Chief Complaint: Left lower back pain    History Of Present Illness:    Juvenal Bagley Jr. is a 65 y.o. male with a significant past medical history of nephrolithiasis, HTN, essential tremor, vitamin D deficiency who presented to [facility] ED due to uncontrolled lower back pain. He states to have been told he has a left kidney stone around 1 week ago at his outpatient urology appointment. Patient was initially scheduled for ESWL at [facility] 1 week from now but he has not been able to tolerate pain. Patient states he has been dealing with 10/10 lower left back pain that radiates to groin for 5-7 days now.      Review of systems: 10 point review of systems is otherwise negative except as mentioned above.    PAST HISTORIES:     Past Medical History:  He has a past medical history of Left hip pain (03/29/2023),  "Personal history of other diseases of the circulatory system, Personal history of other diseases of the nervous system and sense organs, and Primary osteoarthritis of left hip (03/29/2023).    Past Surgical History:  He has a past surgical history that includes Other surgical history (04/10/2015); Appendectomy (04/10/2015); Other surgical history (01/07/2019); and Total hip arthroplasty (Left, 12/15/2022).      Social History:  He reports that he has never smoked. He has never used smokeless tobacco. He reports that he does not currently use alcohol. He reports that he does not use drugs.    Family History:  Family History   Problem Relation Name Age of Onset    Dementia Mother      Breast cancer Mother      Heart attack Mother      Valvular heart disease Mother      Heart attack Father          Allergies:  Versed [midazolam], Ceclor [cefaclor], Doxycycline, and Sulfa (sulfonamide antibiotics)    OBJECTIVE:     Last Recorded Vitals:  Vitals:    03/18/25 0113 03/18/25 0223 03/18/25 0600 03/18/25 0819   BP: (!) 196/94 (!) 181/82 150/83 180/85   BP Location: Right arm   Right arm   Patient Position: Sitting  Sitting Sitting   Pulse: 71  60 72   Resp: 18  18 18   Temp: 36.3 °C (97.3 °F)      TempSrc: Temporal      SpO2: 100%  96% 98%   Weight: 107 kg (235 lb)      Height: 1.753 m (5' 9\")        Last I/O:  No intake/output data recorded.    Physical Exam  Vitals and nursing note reviewed.   Constitutional:       General: He is not in acute distress.  HENT:      Head: Normocephalic and atraumatic.      Mouth/Throat:      Mouth: Mucous membranes are moist.   Eyes:      Extraocular Movements: Extraocular movements intact.      Conjunctiva/sclera: Conjunctivae normal.   Cardiovascular:      Rate and Rhythm: Normal rate and regular rhythm.      Pulses: Normal pulses.      Heart sounds: Normal heart sounds.   Pulmonary:      Effort: Pulmonary effort is normal.      Breath sounds: Normal breath sounds.   Abdominal:      " General: Abdomen is flat. Bowel sounds are normal.      Palpations: Abdomen is soft.   Skin:     General: Skin is warm.   Neurological:      General: No focal deficit present.      Mental Status: He is alert and oriented to person, place, and time.   Psychiatric:         Mood and Affect: Mood normal.         Behavior: Behavior normal.         Thought Content: Thought content normal.         Scheduled Medications     PRN Medications    Continuous Medications  lactated Ringer's, 125 mL/hr        Outpatient Medications:  Prior to Admission medications    Medication Sig Start Date End Date Taking? Authorizing Provider   ascorbic acid (Vitamin C) 1,000 mg tablet Take 1 tablet (1,000 mg) by mouth once daily.   Yes Historical Provider, MD   cholecalciferol (Vitamin D-3) 25 MCG (1000 UT) capsule Take 1 capsule (25 mcg) by mouth once daily.   Yes Historical Provider, MD   glucosamine-chondroitin 250-200 mg tablet Take 1 tablet by mouth 2 times a day.   Yes Historical Provider, MD   ibuprofen 600 mg tablet Take 1 tablet (600 mg) by mouth every 6 hours if needed for mild pain (1 - 3) for up to 7 days. 3/13/25 3/20/25 Yes JOSE Pedro   lisinopril 40 mg tablet Take 1 tablet (40 mg) by mouth once daily. 2/20/25 2/15/26 Yes JOSE Fishman   multivitamin tablet Take 1 tablet by mouth once daily.   Yes Historical Provider, MD   oxyCODONE (Roxicodone) 5 mg immediate release tablet Take 1 tablet (5 mg) by mouth every 6 hours if needed for severe pain (7 - 10) for up to 3 days. 3/13/25  Yes JOSE Pedro   primidone (Mysoline) 50 mg tablet Take 2.5 tablets (125 mg) by mouth once daily at bedtime. 12/30/24  Yes JOSE Hobbs   tamsulosin (Flomax) 0.4 mg 24 hr capsule Take 1 capsule (0.4 mg) by mouth once daily for 10 days. 3/13/25 3/23/25 Yes JOSE Pedro   propranolol (Inderal) 20 mg tablet Take 1 tablet (20 mg) by mouth 2 times a day.  Patient taking differently: Take 2 tablets  (40 mg) by mouth once daily in the morning. 12/17/24 12/17/25  Michelle Torres MD       LABS AND IMAGING:     Labs:  Results from last 7 days   Lab Units 03/18/25  0128 03/13/25  0325   WBC AUTO x10*3/uL 8.1 7.1   RBC AUTO x10*6/uL 4.64 4.68   HEMOGLOBIN g/dL 14.7 14.9   HEMATOCRIT % 45.8 45.2   MCV fL 99 97   MCH pg 31.7 31.8   MCHC g/dL 32.1 33.0   RDW % 12.5 12.8   PLATELETS AUTO x10*3/uL 204 224     Results from last 7 days   Lab Units 03/18/25  0128 03/13/25  0325   SODIUM mmol/L 133* 138   POTASSIUM mmol/L 4.5 3.9   CHLORIDE mmol/L 102 103   CO2 mmol/L 20* 24   BUN mg/dL 28* 17   CREATININE mg/dL 2.23* 1.10   GLUCOSE mg/dL 129* 109*   PROTEIN TOTAL g/dL 7.4 7.5   CALCIUM mg/dL 9.2 9.7   BILIRUBIN TOTAL mg/dL 0.6 0.8   ALK PHOS U/L 65 67   AST U/L 25 21   ALT U/L 18 27               Imaging:  ECG 12 lead  Sinus bradycardia  Minimal voltage criteria for LVH, may be normal variant ( R in aVL )  Borderline ECG  When compared with ECG of 02-JAN-2017 07:09,  Vent. rate has decreased BY  28 BPM  Non-specific change in ST segment in Anterior leads  QT has shortened  CT abdomen pelvis w IV contrast  Narrative: Interpreted By:  Finkelstein, Evan,   STUDY:  CT ABDOMEN PELVIS W IV CONTRAST;  3/13/2025 4:16 am      INDICATION:  Signs/Symptoms:Abdominal pain.          COMPARISON:  None.      ACCESSION NUMBER(S):  [id number]      ORDERING CLINICIAN:  BRENDA JIN      TECHNIQUE:  Axial CT images of the abdomen and pelvis with coronal and sagittal  reconstructed images obtained after intravenous administration of 75  mL Omnipaque 350      FINDINGS:  LOWER CHEST: No acute abnormality of the lung bases.      ABDOMEN:      LIVER: Normal attenuation and contour.  BILE DUCTS: Normal caliber.  GALLBLADDER: No calcified gallstones. No wall thickening.  PORTAL VEIN: Patent  SPLEEN: Unremarkable.  PANCREAS: Unremarkable.  ADRENALS: Unremarkable.  KIDNEYS, URETERS and URINARY BLADDER: Asymmetric renal enhancement,  delayed on the  left.. 6 mm stone in the proximal left ureter just  past the UPJ with mild left hydronephrosis.. Indeterminate 1.9 cm  hypodensity extending from the lower aspect of the left kidney.  Bladder is within normal limits REPRODUCTIVE ORGANS: No pelvic masses.      ABDOMINAL WALL: Fat containing inguinal hernias bilaterally.  PERITONEUM: No ascites, free air or fluid collection.      BOWEL: Gastric wall thickening versus underdistention. Scattered  colonic diverticula without pericolonic inflammatory stranding. The  appendix is not definitively visualized, without focal pericecal  inflammatory stranding. Moderate colonic stool burden.      VESSELS: No aortic aneurysm.  RETROPERITONEUM: No pathologically enlarged retroperitoneal lymph  nodes.      BONES: No acute osseous abnormality. Partially imaged left hip  arthroplasty hardware.      Impression: Obstructing 6 mm stone in the proximal left ureter just past the UPJ  with mild left hydronephrosis      Indeterminate 1.9 cm hypodensity extending from the lower aspect of  the left kidney measuring greater than simple fluid density.  Recommend nonemergent MRI to further characterize.      Gastric wall thickening versus underdistention. Correlate for  symptoms of gastritis      Scattered colonic diverticulosis without CT evidence of acute  diverticulitis.          MACRO:  Critical Finding:  See findings. Notification was initiated on  3/13/2025 at 4:26 am by  Evan Finkelstein.  (**-YCF-**) Instructions:      Signed by: Evan Finkelstein 3/13/2025 4:26 AM  Dictation workstation:   [id number]

## 2025-03-18 NOTE — ANESTHESIA POSTPROCEDURE EVALUATION
Patient: Juvenal Bagley Jr.    Procedure Summary       Date: 03/18/25 Room / Location: [facility] OR 05 / Virtual [facility] OR    Anesthesia Start: 1636 Anesthesia Stop: 1726    Procedure: CYSTOSCOPY, WITH URETERAL STENT INSERTION, LASER (Left) Diagnosis:       Kidney stone      PORTER (acute kidney injury) (CMS-HCC)      (Kidney stone [N20.0])      (PORTER (acute kidney injury) (CMS-HCC) [N17.9])    Surgeons: Juvenal Ribeiro MD Responsible Provider: Todd Valdes MD    Anesthesia Type: general ASA Status: 3            Anesthesia Type: general    Vitals Value Taken Time   BP 95/48 03/18/25 1725   Temp 36.2 °C (97.2 °F) 03/18/25 1725   Pulse 59 03/18/25 1725   Resp 18 03/18/25 1725   SpO2 58 % 03/18/25 1725       Anesthesia Post Evaluation    Patient location during evaluation: PACU  Patient participation: complete - patient participated  Level of consciousness: awake  Pain management: adequate  Airway patency: patent  Cardiovascular status: acceptable  Respiratory status: acceptable and face mask  Hydration status: acceptable  Postoperative Nausea and Vomiting: none        There were no known notable events for this encounter.

## 2025-03-18 NOTE — CARE PLAN
The patient's goals for the shift include    Problem: Pain - Adult  Goal: Verbalizes/displays adequate comfort level or baseline comfort level  Outcome: Progressing     Problem: Safety - Adult  Goal: Free from fall injury  Outcome: Progressing     Problem: Discharge Planning  Goal: Discharge to home or other facility with appropriate resources  Outcome: Progressing     Problem: Chronic Conditions and Co-morbidities  Goal: Patient's chronic conditions and co-morbidity symptoms are monitored and maintained or improved  Outcome: Progressing     Problem: Nutrition  Goal: Nutrient intake appropriate for maintaining nutritional needs  Outcome: Progressing     Problem: Pain  Goal: Takes deep breaths with improved pain control throughout the shift  Outcome: Progressing  Goal: Turns in bed with improved pain control throughout the shift  Outcome: Progressing  Goal: Walks with improved pain control throughout the shift  Outcome: Progressing  Goal: Performs ADL's with improved pain control throughout shift  Outcome: Progressing  Goal: Participates in PT with improved pain control throughout the shift  Outcome: Progressing  Goal: Free from opioid side effects throughout the shift  Outcome: Progressing  Goal: Free from acute confusion related to pain meds throughout the shift  Outcome: Progressing     Problem: Fall/Injury  Goal: Not fall by end of shift  Outcome: Progressing  Goal: Be free from injury by end of the shift  Outcome: Progressing  Goal: Verbalize understanding of personal risk factors for fall in the hospital  Outcome: Progressing  Goal: Verbalize understanding of risk factor reduction measures to prevent injury from fall in the home  Outcome: Progressing  Goal: Use assistive devices by end of the shift  Outcome: Progressing  Goal: Pace activities to prevent fatigue by end of the shift  Outcome: Progressing       The clinical goals for the shift include pt will report adequate pain control this shift

## 2025-03-18 NOTE — OP NOTE
CYSTOSCOPY, WITH URETERAL STENT INSERTION, LASER (L) Operative Note     Date: 3/18/2025  OR Location: [facility] OR    Name: Juvenal Bagley Jr., : 1960, Age: 65 y.o., MRN: [id number], Sex: male    Diagnosis  Pre-op Diagnosis      * Kidney stone [N20.0]     * PORTER (acute kidney injury) (CMS-[facility]) [N17.9] Post-op Diagnosis     * Kidney stone [N20.0]     * PORTER (acute kidney injury) (CMS-[facility]) [N17.9]     Procedures  CYSTOSCOPY, WITH URETERAL STENT INSERTION, LASER  44031 - DC CYSTO W/INSERT URETERAL STENT      Surgeons      * Juvenal Ribeiro - Primary    Resident/Fellow/Other Assistant:  Surgeons and Role:  * No surgeons found with a matching role *    Staff:   Circulator: Yeny Bell Person: Tg Huerta Scrub: Nisreen    Anesthesia Staff: Anesthesiologist: Todd Valdes MD  CRNA: JUSTO Rodriguez-CRNA    Procedure Summary  Anesthesia: General  ASA: III  Estimated Blood Loss: mL  Intra-op Medications:   Administrations occurring from 1625 to 1720 on 25:   Medication Name Total Dose   iohexol (OMNIPaque) 300 mg iodine/mL solution 5 mL   ciprofloxacin (Cipro)  mg in 200 mL D5W - premix 400 mg   fentaNYL (Sublimaze) injection 50 mcg/mL 100 mcg   lactated Ringer's infusion 97.92 mL   lidocaine (Xylocaine) injection 2 % 80 mg   phenylephrine 100 mcg/mL syringe 10 mL (prefilled) 300 mcg   propofol (Diprivan) injection 10 mg/mL 200 mg              Anesthesia Record               Intraprocedure I/O Totals          Intake    lactated Ringer's infusion 500.00 mL    Total Intake 500 mL       Output    Urine 0 mL    Est. Blood Loss 0 mL    Total Output 0 mL       Net    Net Volume 500 mL          Specimen: No specimens collected              Drains and/or Catheters: * None in log *    Tourniquet Times:         Implants:  Implants       Type Name Action Serial No.      Stent STENT, URETERAL PERCUFLEX 6 X 28 - [id number] Implanted               Findings:     Indications: Juvenal Bagley Jr. is an 65 y.o. male  who is having surgery for Kidney stone [N20.0]  PORTER (acute kidney injury) (CMS-[facility]) [N17.9].     The patient was seen in the preoperative area. The risks, benefits, complications, treatment options, non-operative alternatives, expected recovery and outcomes were discussed with the patient. The possibilities of reaction to medication, pulmonary aspiration, injury to surrounding structures, bleeding, recurrent infection, the need for additional procedures, failure to diagnose a condition, and creating a complication requiring transfusion or operation were discussed with the patient. The patient concurred with the proposed plan, giving informed consent.  The site of surgery was properly noted/marked if necessary per policy. The patient has been actively warmed in preoperative area. Preoperative antibiotics have been ordered and given within 1 hours of incision. Venous thrombosis prophylaxis have been ordered including bilateral sequential compression devices    Procedure Details:     Preoperative diagnosis-left ureter stone and PORTER    Postoperative diagnosis-same    Surgery performed-left flexible ureteroscopy with holmium laser lithotripsy of ureter stone basket extraction retrograde pyelogram and stent placement        Anesthesia-General    Indications-patient presented today with obstructing stone and PORTER which to proceed with the surgery risk benefits and alternatives were discussed with the patient and included but were not limited to bleeding infection need for stent stent discomfort stent removed in office ureteral stricture perforation and damage need for additional procedure to fix his problems well as complication anesthesia    Findings-Stone was moderately impacted in the proximal left ureter broken and dusted pieces were less than 1 mm multiple small tiny bits were extracted too small for analysis and a 28 x 6 stent was placed his prostate was moderate trilobar obstructing bladder was otherwise normal as  was the urethra    Summary of procedure-patient was prepped and draped in the usual sterile fashion and given IV Cipro.  A 21 Romanian scope was placed through the meatus.  Panendoscopy of the bladder was performed.  An 8 Romanian catheter gently advanced in the left ureteral orifice through which an angled wire was taken left kidney 10 dilator over this second wire in the left kidney with 2 wires in place the 1113 Romanian ureteral access sheath was gently advanced to the point just distal to the proximal ureter stone.  The disposable flexible ureteroscope was then used and the single action pump was used visualization was good and stone was encountered.  It was very hard but able to be broken into tiny bits with the 200 µm holmium laser fiber at a setting primarily of 50 and 0.2 and 8 and 1.0.  At that after the stone was broken from the ureter we chased up into the kidney retrograde pyelogram delineate the anatomy.  The kidney had significant amount of debris from the obstruction with a stone making visualization in this area difficult.  I used fluoroscopy and did not see any other obvious large stones.  I then came back down the ureter the ureter was intact and over the safety wire a 28 x 6 stent was placed with good positioning noted.  Bladder was then emptied and he was awoken from anesthesia in good condition.    Disposition-patient will follow-up in 1 to 2 weeks for stent removal.  Stones were too small for analysis today but is going to be a calcium oxalate monohydrate based on his visualization.  He would benefit from a follow-up ultrasound in approximately 4 weeks after stent is removed to assure that the ureter is healed and he will need a follow-up BMP as well        Juvenal Ribeiro  Phone Number: [phone]

## 2025-03-18 NOTE — CONSULTS
"Reason For Consult  Left ureter stone/camelia    History Of Present Illness  Juvenal Bagley Jr. is a 65 y.o. male presenting with left renal colic--CT 3/13/25 shows 6mm prox left ureter stone and now back w/ pain and creat bump to 2.2 from 1.1 Had surgery w/ Dr lamas for Monday for eswl I believe at [facility]    Labs/xrays/notes indep reviewed/interpreted by me.     Past Medical History  He has a past medical history of Left hip pain (03/29/2023), Personal history of other diseases of the circulatory system, Personal history of other diseases of the nervous system and sense organs, and Primary osteoarthritis of left hip (03/29/2023).    Surgical History  He has a past surgical history that includes Other surgical history (04/10/2015); Appendectomy (04/10/2015); Other surgical history (01/07/2019); and Total hip arthroplasty (Left, 12/15/2022).     Social History  He reports that he has never smoked. He has never used smokeless tobacco. He reports that he does not currently use alcohol. He reports that he does not use drugs.    Family History  Family History   Problem Relation Name Age of Onset    Dementia Mother      Breast cancer Mother      Heart attack Mother      Valvular heart disease Mother      Heart attack Father          Allergies  Versed [midazolam], Ceclor [cefaclor], Doxycycline, and Sulfa (sulfonamide antibiotics)    Review of Systems       Physical Exam       Last Recorded Vitals  Blood pressure 180/85, pulse 72, temperature 36.3 °C (97.3 °F), temperature source Temporal, resp. rate 18, height 1.753 m (5' 9\"), weight 107 kg (235 lb), SpO2 98%.    Relevant Results  Results for orders placed or performed during the hospital encounter of 03/18/25 (from the past 24 hours)   CBC with Differential   Result Value Ref Range    WBC 8.1 4.4 - 11.3 x10*3/uL    nRBC 0.0 0.0 - 0.0 /100 WBCs    RBC 4.64 4.50 - 5.90 x10*6/uL    Hemoglobin 14.7 13.5 - 17.5 g/dL    Hematocrit 45.8 41.0 - 52.0 %    MCV 99 80 - 100 fL    MCH 31.7 " 26.0 - 34.0 pg    MCHC 32.1 32.0 - 36.0 g/dL    RDW 12.5 11.5 - 14.5 %    Platelets 204 150 - 450 x10*3/uL    Neutrophils % 63.9 40.0 - 80.0 %    Immature Granulocytes %, Automated 0.6 0.0 - 0.9 %    Lymphocytes % 20.1 13.0 - 44.0 %    Monocytes % 12.0 2.0 - 10.0 %    Eosinophils % 2.8 0.0 - 6.0 %    Basophils % 0.6 0.0 - 2.0 %    Neutrophils Absolute 5.17 1.20 - 7.70 x10*3/uL    Immature Granulocytes Absolute, Automated 0.05 0.00 - 0.70 x10*3/uL    Lymphocytes Absolute 1.63 1.20 - 4.80 x10*3/uL    Monocytes Absolute 0.97 0.10 - 1.00 x10*3/uL    Eosinophils Absolute 0.23 0.00 - 0.70 x10*3/uL    Basophils Absolute 0.05 0.00 - 0.10 x10*3/uL   Comprehensive Metabolic Panel   Result Value Ref Range    Glucose 129 (H) 74 - 99 mg/dL    Sodium 133 (L) 136 - 145 mmol/L    Potassium 4.5 3.5 - 5.3 mmol/L    Chloride 102 98 - 107 mmol/L    Bicarbonate 20 (L) 21 - 32 mmol/L    Anion Gap 16 10 - 20 mmol/L    Urea Nitrogen 28 (H) 6 - 23 mg/dL    Creatinine 2.23 (H) 0.50 - 1.30 mg/dL    eGFR 32 (L) >60 mL/min/1.73m*2    Calcium 9.2 8.6 - 10.3 mg/dL    Albumin 4.4 3.4 - 5.0 g/dL    Alkaline Phosphatase 65 33 - 136 U/L    Total Protein 7.4 6.4 - 8.2 g/dL    AST 25 9 - 39 U/L    Bilirubin, Total 0.6 0.0 - 1.2 mg/dL    ALT 18 10 - 52 U/L   Urinalysis with Reflex Culture and Microscopic   Result Value Ref Range    Color, Urine Light-Yellow Light-Yellow, Yellow, Dark-Yellow    Appearance, Urine Clear Clear    Specific Gravity, Urine 1.015 1.005 - 1.035    pH, Urine 5.5 5.0, 5.5, 6.0, 6.5, 7.0, 7.5, 8.0    Protein, Urine NEGATIVE NEGATIVE, 10 (TRACE), 20 (TRACE) mg/dL    Glucose, Urine Normal Normal mg/dL    Blood, Urine NEGATIVE NEGATIVE mg/dL    Ketones, Urine NEGATIVE NEGATIVE mg/dL    Bilirubin, Urine NEGATIVE NEGATIVE mg/dL    Urobilinogen, Urine Normal Normal mg/dL    Nitrite, Urine NEGATIVE NEGATIVE    Leukocyte Esterase, Urine NEGATIVE NEGATIVE       No results found.       Assessment/Plan     -6mm prox left ureter stone by ct  3/13  -camelia  -flank pain    Pt seen and went thru optiosn--wishes to proceed w/ surgry today on urgent basis and r/b/a dw him and wife.    CT 3/13/25 shows 6mm prox left ureter stone and now back w/ pain and creat bump to 2.2 from 1.1 Had surgery w/ Dr lamas for Monday for eswl I believe at [facility]      Juvenal Ribeiro MD

## 2025-03-19 ENCOUNTER — PHARMACY VISIT (OUTPATIENT)
Dept: PHARMACY | Facility: CLINIC | Age: 65
End: 2025-03-19
Payer: COMMERCIAL

## 2025-03-19 VITALS
OXYGEN SATURATION: 95 % | SYSTOLIC BLOOD PRESSURE: 132 MMHG | WEIGHT: 235 LBS | HEART RATE: 98 BPM | TEMPERATURE: 98.6 F | RESPIRATION RATE: 18 BRPM | DIASTOLIC BLOOD PRESSURE: 59 MMHG | BODY MASS INDEX: 34.8 KG/M2 | HEIGHT: 69 IN

## 2025-03-19 LAB
ALBUMIN SERPL BCP-MCNC: 3.9 G/DL (ref 3.4–5)
ANION GAP SERPL CALC-SCNC: 13 MMOL/L (ref 10–20)
BUN SERPL-MCNC: 20 MG/DL (ref 6–23)
CALCIUM SERPL-MCNC: 8.7 MG/DL (ref 8.6–10.3)
CHLORIDE SERPL-SCNC: 104 MMOL/L (ref 98–107)
CO2 SERPL-SCNC: 23 MMOL/L (ref 21–32)
CREAT SERPL-MCNC: 1.42 MG/DL (ref 0.5–1.3)
EGFRCR SERPLBLD CKD-EPI 2021: 54 ML/MIN/1.73M*2
ERYTHROCYTE [DISTWIDTH] IN BLOOD BY AUTOMATED COUNT: 12.6 % (ref 11.5–14.5)
GLUCOSE SERPL-MCNC: 88 MG/DL (ref 74–99)
HCT VFR BLD AUTO: 38.2 % (ref 41–52)
HGB BLD-MCNC: 12.9 G/DL (ref 13.5–17.5)
MAGNESIUM SERPL-MCNC: 1.77 MG/DL (ref 1.6–2.4)
MCH RBC QN AUTO: 32.2 PG (ref 26–34)
MCHC RBC AUTO-ENTMCNC: 33.8 G/DL (ref 32–36)
MCV RBC AUTO: 95 FL (ref 80–100)
NRBC BLD-RTO: 0 /100 WBCS (ref 0–0)
PHOSPHATE SERPL-MCNC: 4.1 MG/DL (ref 2.5–4.9)
PLATELET # BLD AUTO: 172 X10*3/UL (ref 150–450)
POTASSIUM SERPL-SCNC: 4.2 MMOL/L (ref 3.5–5.3)
RBC # BLD AUTO: 4.01 X10*6/UL (ref 4.5–5.9)
SODIUM SERPL-SCNC: 136 MMOL/L (ref 136–145)
WBC # BLD AUTO: 7.4 X10*3/UL (ref 4.4–11.3)

## 2025-03-19 PROCEDURE — G0378 HOSPITAL OBSERVATION PER HR: HCPCS

## 2025-03-19 PROCEDURE — 83735 ASSAY OF MAGNESIUM: CPT | Performed by: UROLOGY

## 2025-03-19 PROCEDURE — 2500000004 HC RX 250 GENERAL PHARMACY W/ HCPCS (ALT 636 FOR OP/ED): Performed by: STUDENT IN AN ORGANIZED HEALTH CARE EDUCATION/TRAINING PROGRAM

## 2025-03-19 PROCEDURE — RXMED WILLOW AMBULATORY MEDICATION CHARGE

## 2025-03-19 PROCEDURE — 2500000004 HC RX 250 GENERAL PHARMACY W/ HCPCS (ALT 636 FOR OP/ED): Performed by: UROLOGY

## 2025-03-19 PROCEDURE — 80069 RENAL FUNCTION PANEL: CPT | Performed by: UROLOGY

## 2025-03-19 PROCEDURE — 85027 COMPLETE CBC AUTOMATED: CPT | Performed by: UROLOGY

## 2025-03-19 PROCEDURE — 2500000002 HC RX 250 W HCPCS SELF ADMINISTERED DRUGS (ALT 637 FOR MEDICARE OP, ALT 636 FOR OP/ED): Mod: MUE | Performed by: UROLOGY

## 2025-03-19 PROCEDURE — 96376 TX/PRO/DX INJ SAME DRUG ADON: CPT

## 2025-03-19 PROCEDURE — 99239 HOSP IP/OBS DSCHRG MGMT >30: CPT | Performed by: STUDENT IN AN ORGANIZED HEALTH CARE EDUCATION/TRAINING PROGRAM

## 2025-03-19 PROCEDURE — 36415 COLL VENOUS BLD VENIPUNCTURE: CPT | Performed by: UROLOGY

## 2025-03-19 RX ORDER — TAMSULOSIN HYDROCHLORIDE 0.4 MG/1
0.4 CAPSULE ORAL DAILY
Qty: 30 CAPSULE | Refills: 0 | Status: SHIPPED | OUTPATIENT
Start: 2025-03-19 | End: 2025-04-18

## 2025-03-19 RX ORDER — TAMSULOSIN HYDROCHLORIDE 0.4 MG/1
0.4 CAPSULE ORAL DAILY
Qty: 30 CAPSULE | Refills: 0 | Status: SHIPPED | OUTPATIENT
Start: 2025-03-19 | End: 2025-03-19

## 2025-03-19 RX ADMIN — SODIUM CHLORIDE, POTASSIUM CHLORIDE, SODIUM LACTATE AND CALCIUM CHLORIDE 125 ML/HR: 600; 310; 30; 20 INJECTION, SOLUTION INTRAVENOUS at 03:23

## 2025-03-19 RX ADMIN — HYDROMORPHONE HYDROCHLORIDE 0.4 MG: 1 INJECTION, SOLUTION INTRAMUSCULAR; INTRAVENOUS; SUBCUTANEOUS at 08:00

## 2025-03-19 RX ADMIN — POLYETHYLENE GLYCOL 3350 17 G: 17 POWDER, FOR SOLUTION ORAL at 08:19

## 2025-03-19 RX ADMIN — HYDROMORPHONE HYDROCHLORIDE 0.6 MG: 1 INJECTION, SOLUTION INTRAMUSCULAR; INTRAVENOUS; SUBCUTANEOUS at 01:46

## 2025-03-19 RX ADMIN — TAMSULOSIN HYDROCHLORIDE 0.4 MG: 0.4 CAPSULE ORAL at 08:00

## 2025-03-19 RX ADMIN — PROPRANOLOL HYDROCHLORIDE 40 MG: 20 TABLET ORAL at 08:00

## 2025-03-19 ASSESSMENT — COGNITIVE AND FUNCTIONAL STATUS - GENERAL
DAILY ACTIVITIY SCORE: 24
MOBILITY SCORE: 24

## 2025-03-19 ASSESSMENT — PAIN SCALES - GENERAL
PAINLEVEL_OUTOF10: 6
PAINLEVEL_OUTOF10: 3
PAINLEVEL_OUTOF10: 7
PAINLEVEL_OUTOF10: 0 - NO PAIN

## 2025-03-19 ASSESSMENT — PAIN DESCRIPTION - ORIENTATION
ORIENTATION: LEFT
ORIENTATION: LEFT

## 2025-03-19 ASSESSMENT — ACTIVITIES OF DAILY LIVING (ADL): LACK_OF_TRANSPORTATION: NO

## 2025-03-19 ASSESSMENT — PAIN - FUNCTIONAL ASSESSMENT
PAIN_FUNCTIONAL_ASSESSMENT: 0-10

## 2025-03-19 ASSESSMENT — PAIN DESCRIPTION - LOCATION
LOCATION: OTHER (COMMENT)
LOCATION: ABDOMEN

## 2025-03-19 NOTE — NURSING NOTE
PT.DISCHARGED HOME--WIFE FOR TRANSPORT.SALINE LOCK REMOVED.  PT.VERBALIZES UNDERSTANDING OF DISCHARGE INSTRUCTIONS. NO ACUTE DISTRESS NOTED.

## 2025-03-19 NOTE — CARE PLAN
The patient's goals for the shift include      The clinical goals for the shift include pt.will be seen by pcp/consults,recieve prescribed meds/tx's.

## 2025-03-19 NOTE — CARE PLAN
The patient's goals for the shift include  sleep    The clinical goals for the shift include pain control    Problem: Pain - Adult  Goal: Verbalizes/displays adequate comfort level or baseline comfort level  Outcome: Progressing     Problem: Safety - Adult  Goal: Free from fall injury  Outcome: Progressing     Problem: Discharge Planning  Goal: Discharge to home or other facility with appropriate resources  Outcome: Progressing     Problem: Chronic Conditions and Co-morbidities  Goal: Patient's chronic conditions and co-morbidity symptoms are monitored and maintained or improved  Outcome: Progressing     Problem: Nutrition  Goal: Nutrient intake appropriate for maintaining nutritional needs  Outcome: Progressing

## 2025-03-19 NOTE — PROGRESS NOTES
"Juvenal Bagley Jr. is a 65 y.o. male on day 0 of admission presenting with Kidney stone.    Subjective          Objective     Physical Exam    Last Recorded Vitals  Blood pressure 148/77, pulse 61, temperature 36.7 °C (98.1 °F), temperature source Temporal, resp. rate 18, height 1.753 m (5' 9\"), weight 107 kg (235 lb), SpO2 94%.  Intake/Output last 3 Shifts:  I/O last 3 completed shifts:  In: 2582.9 (24.2 mL/kg) [P.O.:60; I.V.:2522.9 (23.7 mL/kg)]  Out: 1480 (13.9 mL/kg) [Urine:1480 (0.4 mL/kg/hr)]  Weight: 106.6 kg     Relevant Results    Results for orders placed or performed during the hospital encounter of 03/18/25 (from the past 24 hours)   CBC   Result Value Ref Range    WBC 7.4 4.4 - 11.3 x10*3/uL    nRBC 0.0 0.0 - 0.0 /100 WBCs    RBC 4.01 (L) 4.50 - 5.90 x10*6/uL    Hemoglobin 12.9 (L) 13.5 - 17.5 g/dL    Hematocrit 38.2 (L) 41.0 - 52.0 %    MCV 95 80 - 100 fL    MCH 32.2 26.0 - 34.0 pg    MCHC 33.8 32.0 - 36.0 g/dL    RDW 12.6 11.5 - 14.5 %    Platelets 172 150 - 450 x10*3/uL   Renal Function Panel   Result Value Ref Range    Glucose 88 74 - 99 mg/dL    Sodium 136 136 - 145 mmol/L    Potassium 4.2 3.5 - 5.3 mmol/L    Chloride 104 98 - 107 mmol/L    Bicarbonate 23 21 - 32 mmol/L    Anion Gap 13 10 - 20 mmol/L    Urea Nitrogen 20 6 - 23 mg/dL    Creatinine 1.42 (H) 0.50 - 1.30 mg/dL    eGFR 54 (L) >60 mL/min/1.73m*2    Calcium 8.7 8.6 - 10.3 mg/dL    Phosphorus 4.1 2.5 - 4.9 mg/dL    Albumin 3.9 3.4 - 5.0 g/dL   Magnesium   Result Value Ref Range    Magnesium 1.77 1.60 - 2.40 mg/dL                         Assessment/Plan     -left ureter stone s/p left urs/stone package w/ stent 3/18/25  -camelia--improved    Can dc home and fu with me in office 1-2 weeks for stent removal      Juvenal Ribeiro MD      "

## 2025-03-19 NOTE — PROGRESS NOTES
03/19/25 1113   Discharge Planning   Living Arrangements Spouse/significant other   Support Systems Spouse/significant other   Assistance Needed none   Type of Residence Private residence   Home or Post Acute Services None   Expected Discharge Disposition Home   Does the patient need discharge transport arranged? No   Financial Resource Strain   How hard is it for you to pay for the very basics like food, housing, medical care, and heating? Not hard   Housing Stability   In the last 12 months, was there a time when you were not able to pay the mortgage or rent on time? N   At any time in the past 12 months, were you homeless or living in a shelter (including now)? N   Transportation Needs   In the past 12 months, has lack of transportation kept you from medical appointments or from getting medications? no   In the past 12 months, has lack of transportation kept you from meetings, work, or from getting things needed for daily living? No   Intensity of Service   Intensity of Service 0-30 min     Spoke to patient at bedside to explain my role in discharge planning. Patient states he lives at home with his wife. PCP is Rudy Handley. He uses [facility] in [place] for prescriptions. Patient feels he effectively manages his health at home and plans to return home when medically ready.

## 2025-03-19 NOTE — DISCHARGE SUMMARY
Discharge Diagnosis  Kidney stone    Issues Requiring Follow-Up  Urology follow-up    Discharge Meds     Medication List      CONTINUE taking these medications     ascorbic acid 1,000 mg tablet; Commonly known as: Vitamin C   cholecalciferol 25 MCG (1000 UT) capsule; Commonly known as: Vitamin D-3   glucosamine-chondroitin 250-200 mg tablet   lisinopril 40 mg tablet; Take 1 tablet (40 mg) by mouth once daily.   multivitamin tablet   oxyCODONE 5 mg immediate release tablet; Commonly known as: Roxicodone;   Take 1 tablet (5 mg) by mouth every 6 hours if needed for severe pain (7 -   10) for up to 3 days.   primidone 50 mg tablet; Commonly known as: Mysoline; Take 2.5 tablets   (125 mg) by mouth once daily at bedtime.   propranolol 20 mg tablet; Commonly known as: Inderal; Take 1 tablet (20   mg) by mouth 2 times a day.   tamsulosin 0.4 mg 24 hr capsule; Commonly known as: Flomax; Take 1   capsule (0.4 mg) by mouth once daily.     STOP taking these medications     ibuprofen 600 mg tablet       Test Results Pending At Discharge  Pending Labs       No current pending labs.            Hospital Course  Juvenal Bagley Jr. is a 65 y.o. male admitted to [facility] for management of:     Nephrolithiasis  Mild left hydronephrosis  PORTER 2/2 above  -X-ray of abdomen revealed 8 mm proximal left ureteral calculus is in similar position to CT 5 days ago  - sCr 2.23 (baseline ~1.0) -> sCr improved to 1.42  - renally dose medications, strict I/Os  - Pain control regimen with prn Tylenol & prn IV Dilaudid  - Urology consulted and patient is now s/p left urs/stone package w/ stent 3/18/25. Will need follow-up in 1-2 weeks for stent removal.    > 30min in discharge coordination of care which includes: discharge planning, medication reconciliation, arranging appropriate follow up and discussion of discharge instructions with the patient.      Pertinent Physical Exam At Time of Discharge  Physical Exam  Constitutional:       Appearance:  Normal appearance.   HENT:      Head: Normocephalic and atraumatic.      Right Ear: Tympanic membrane and external ear normal.      Left Ear: Tympanic membrane and external ear normal.      Nose: Nose normal.      Mouth/Throat:      Mouth: Mucous membranes are moist.      Pharynx: Oropharynx is clear.   Eyes:      Extraocular Movements: Extraocular movements intact.      Conjunctiva/sclera: Conjunctivae normal.      Pupils: Pupils are equal, round, and reactive to light.   Cardiovascular:      Rate and Rhythm: Normal rate and regular rhythm.      Pulses: Normal pulses.      Heart sounds: Normal heart sounds.   Pulmonary:      Effort: Pulmonary effort is normal.      Breath sounds: Normal breath sounds.   Abdominal:      General: Bowel sounds are normal.      Palpations: Abdomen is soft.   Musculoskeletal:      Cervical back: Normal range of motion and neck supple.   Skin:     General: Skin is warm and dry.   Neurological:      General: No focal deficit present.      Mental Status: He is alert and oriented to person, place, and time. Mental status is at baseline.   Psychiatric:         Mood and Affect: Mood normal.         Outpatient Follow-Up  Future Appointments   Date Time Provider Department [place]   4/1/2025  1:20 PM JOSE Fishman [id number] [place]   4/28/2025  9:00 AM Michelle Torres MD [id number] [place]   8/13/2025  1:15 PM Yuriy Alejo MD [id number] [place]   10/14/2025 10:40 AM JOSE Fishman [id number] [place]   10/20/2025  9:40 AM JOSE Fishman [id number] [place]   2/11/2026  9:30 AM Tracy M Schwab, APRN-CNP [id number] [place]         Jules Green DO

## 2025-03-20 ENCOUNTER — PATIENT OUTREACH (OUTPATIENT)
Dept: PRIMARY CARE | Facility: CLINIC | Age: 65
End: 2025-03-20
Payer: MEDICARE

## 2025-03-20 NOTE — PROGRESS NOTES
Discharge Facility: [facility]  Discharge Diagnosis: kidney stone  Admission Date: 3/18/2025  Discharge Date: 3/19/2025    PCP Appointment Date: 4/1/2025  Specialist Appointment Date:   -neurology 4/28/2025  -cardiology 8/13/2025  -Schedule an appointment with Juvenal Ribeiro as soon as possible for a visit in 1 week Please follow up with Dr. Ribeiro in 1-2 weeks for stent removal     Hospital Encounter and Summary Linked: Yes  ED to Hosp-Admission (Discharged) with Jules Green DO (03/18/2025)   See discharge assessment below for further details    Hospital Course  Juvenal Bagley Jr. is a 65 y.o. male admitted to [facility] for management of:     Nephrolithiasis  Mild left hydronephrosis  PORTER 2/2 above  -X-ray of abdomen revealed 8 mm proximal left ureteral calculus is in similar position to CT 5 days ago  - sCr 2.23 (baseline ~1.0) -> sCr improved to 1.42  - renally dose medications, strict I/Os  - Pain control regimen with prn Tylenol & prn IV Dilaudid  - Urology consulted and patient is now s/p left urs/stone package w/ stent 3/18/25. Will need follow-up in 1-2 weeks for stent removal.    Wrap Up  Wrap Up Additional Comments: CTS spoke with Patient. He was admitted to [facility] with a kidney stone on 3/18/2025. Patient discharged on 3/19/2025 with no home care needs. Patient stated that he was doing ok. Denies any chest pains or shortness of breath. No kidney pain. Has stent in place. Will follow up with urology to get the stent removed. No change in meds except to stop ibuprofen. Understnads discharge instructions. Micaela does have an appt with PCP on 4/1/2025. No qustions or concerns at this time. (3/20/2025 12:50 PM)  Call End Time: 1255 (3/20/2025 12:50 PM)    Engagement  Call Start Time: 1250 (3/20/2025 12:50 PM)    Medications  Medications reviewed with patient/caregiver?: Yes (3/20/2025 12:50 PM)  Is the patient having any side effects they believe may be caused by any medication additions or changes?: No  (3/20/2025 12:50 PM)  Does the patient have all medications ordered at discharge?: Yes (3/20/2025 12:50 PM)  Care Management Interventions: No intervention needed (3/20/2025 12:50 PM)  Prescription Comments: stop Ibuprofen (3/20/2025 12:50 PM)  Is the patient taking all medications as directed (includes completed medication regime)?: Yes (3/20/2025 12:50 PM)  Medication Comments: see med list (3/20/2025 12:50 PM)    Appointments  Does the patient have a primary care provider?: Yes (3/20/2025 12:50 PM)  Care Management Interventions: Verified appointment date/time/provider (3/20/2025 12:50 PM)  Has the patient kept scheduled appointments due by today?: Yes (3/20/2025 12:50 PM)  Care Management Interventions: Advised patient to keep appointment (3/20/2025 12:50 PM)    Self Management  What is the home health agency?: none (3/20/2025 12:50 PM)  Has home health visited the patient within 72 hours of discharge?: Not applicable (3/20/2025 12:50 PM)  What Durable Medical Equipment (DME) was ordered?: n/a (3/20/2025 12:50 PM)    Patient Teaching  Does the patient have access to their discharge instructions?: Yes (3/20/2025 12:50 PM)  Care Management Interventions: Reviewed instructions with patient (3/20/2025 12:50 PM)  What is the patient's perception of their health status since discharge?: Improving (3/20/2025 12:50 PM)  Is the patient/caregiver able to teach back the hierarchy of who to call/visit for symptoms/problems? PCP, Specialist, Home Health nurse, Urgent Care, ED, 911: Yes (3/20/2025 12:50 PM)  Patient/Caregiver Education Comments: see wrap up (3/20/2025 12:50 PM)

## 2025-04-01 ENCOUNTER — APPOINTMENT (OUTPATIENT)
Dept: PRIMARY CARE | Facility: CLINIC | Age: 65
End: 2025-04-01
Payer: MEDICARE

## 2025-04-01 VITALS
RESPIRATION RATE: 16 BRPM | BODY MASS INDEX: 34.58 KG/M2 | TEMPERATURE: 98 F | HEART RATE: 65 BPM | DIASTOLIC BLOOD PRESSURE: 68 MMHG | SYSTOLIC BLOOD PRESSURE: 130 MMHG | OXYGEN SATURATION: 97 % | WEIGHT: 233.5 LBS | HEIGHT: 69 IN

## 2025-04-01 DIAGNOSIS — N20.0 KIDNEY STONE: Primary | ICD-10-CM

## 2025-04-01 DIAGNOSIS — Z09 FOLLOW-UP EXAMINATION AFTER UROLOGICAL SURGERY: ICD-10-CM

## 2025-04-01 DIAGNOSIS — N13.2 HYDRONEPHROSIS WITH URINARY OBSTRUCTION DUE TO RENAL CALCULUS: ICD-10-CM

## 2025-04-01 PROCEDURE — 1160F RVW MEDS BY RX/DR IN RCRD: CPT | Performed by: NURSE PRACTITIONER

## 2025-04-01 PROCEDURE — 3075F SYST BP GE 130 - 139MM HG: CPT | Performed by: NURSE PRACTITIONER

## 2025-04-01 PROCEDURE — 3008F BODY MASS INDEX DOCD: CPT | Performed by: NURSE PRACTITIONER

## 2025-04-01 PROCEDURE — 1123F ACP DISCUSS/DSCN MKR DOCD: CPT | Performed by: NURSE PRACTITIONER

## 2025-04-01 PROCEDURE — 99495 TRANSJ CARE MGMT MOD F2F 14D: CPT | Performed by: NURSE PRACTITIONER

## 2025-04-01 PROCEDURE — 3078F DIAST BP <80 MM HG: CPT | Performed by: NURSE PRACTITIONER

## 2025-04-01 PROCEDURE — 1159F MED LIST DOCD IN RCRD: CPT | Performed by: NURSE PRACTITIONER

## 2025-04-01 NOTE — PROGRESS NOTES
Subjective   Juvenal Bagley Jr. is a 65 y.o. male who presents for Hospital Follow-up (Hospital follow  up  3.18-3.19.2025).  Hospital follow  up   TCM  call done -3.20.2025  Discharge Facility: [facility]  Discharge Diagnosis: kidney stone  Admission Date: 3/18/2025  Discharge Date: 3/19/2025     PCP Appointment Date: 4/1/2025  Specialist Appointment Date:   -neurology 4/28/2025  -cardiology 8/13/2025  -Schedule an appointment with Juvenal Ribeiro as soon as possible for a visit in 1 week Please follow up with Dr. Ribeiro in 1-2 weeks for stent removal      Hospital Encounter and Summary Linked: Yes  ED to Hosp-Admission (Discharged) with Jules Green DO (03/18/2025)   See discharge assessment below for further details     Hospital Course  Juvenal Bagley Jr. is a 65 y.o. male admitted to [facility] for management of:  Nephrolithiasis  Mild left hydronephrosis  PORTER 2/2 above  -X-ray of abdomen revealed 8 mm proximal left ureteral calculus is in similar position to CT 5 days ago  - sCr 2.23 (baseline ~1.0) -> sCr improved to 1.42  - renally dose medications, strict I/Os  - Pain control regimen with prn Tylenol & prn IV Dilaudid  - Urology consulted and patient is now s/p left urs/stone package w/ stent 3/18/25. Will need follow-up in 1-2 weeks for stent removal.     Wrap Up  Wrap Up Additional Comments: CTS spoke with Patient. He was admitted to [facility] with a kidney stone on 3/18/2025. Patient discharged on 3/19/2025 with no home care needs. Patient stated that he was doing ok. Denies any chest pains or shortness of breath. No kidney pain. Has stent in place. Will follow up with urology to get the stent removed. No change in meds except to stop ibuprofen. Understnads discharge instructions. Micaela does have an appt with PCP on 4/1/2025. No qustions or concerns at this time. (3/20/2025 12:50 PM)    ---------------------------------------------------------    Yesterday  went  to  see urologist (Dr. Ribeiro @ [facility] [not a  [facility] provider])  and  removed the stent, patient states he  is feeling well , no pain   Patient is  exercising  daily, following  mediterranean diet   Stent removed yesterday, he is feeling well, no pain   No issues urinating, no hematuria, no more frequency, no back pain, no fevers  Drinking water daily, ~70oz   Eager to return to exercising (usually 7 days per week)  Taking Flomax daily        Review of Systems    Objective   Physical Exam  Constitutional:       General: He is not in acute distress.     Appearance: He is not ill-appearing.   Cardiovascular:      Rate and Rhythm: Normal rate and regular rhythm.   Pulmonary:      Effort: Pulmonary effort is normal.      Breath sounds: Normal breath sounds.   Abdominal:      General: Abdomen is flat. Bowel sounds are normal.      Palpations: Abdomen is soft.   Musculoskeletal:         General: Normal range of motion.   Skin:     General: Skin is warm and dry.   Neurological:      Mental Status: He is alert.   Psychiatric:         Mood and Affect: Mood normal.         Behavior: Behavior normal.       /68 (BP Location: Right arm, Patient Position: Sitting)   Pulse 65   Temp 36.7 °C (98 °F)   Resp 16   Ht 1.753 m (5' 9\")   Wt 106 kg (233 lb 8 oz)   SpO2 97%   BMI 34.48 kg/m²       Assessment/Plan   Problem List Items Addressed This Visit       Kidney stone - Primary     Other Visit Diagnoses       Hydronephrosis with urinary obstruction due to renal calculus        Follow-up examination after urological surgery              Patient Instructions   Continue meds as ordered, keep scheduled follow-ups with specialists. Follow-up in 6 months, or sooner if needed. Call the office if any problems or concerns in the meantime.       "

## 2025-04-04 ENCOUNTER — PATIENT OUTREACH (OUTPATIENT)
Dept: PRIMARY CARE | Facility: CLINIC | Age: 65
End: 2025-04-04
Payer: MEDICARE

## 2025-04-09 NOTE — PATIENT INSTRUCTIONS
Continue meds as ordered, keep scheduled follow-ups with specialists. Follow-up in 6 months, or sooner if needed. Call the office if any problems or concerns in the meantime.

## 2025-04-25 DIAGNOSIS — M10.9 ACUTE GOUT OF RIGHT FOOT, UNSPECIFIED CAUSE: ICD-10-CM

## 2025-04-27 RX ORDER — COLCHICINE 0.6 MG/1
TABLET ORAL
Qty: 16 TABLET | Refills: 0 | Status: SHIPPED | OUTPATIENT
Start: 2025-04-27

## 2025-04-28 ENCOUNTER — OFFICE VISIT (OUTPATIENT)
Dept: NEUROLOGY | Facility: CLINIC | Age: 65
End: 2025-04-28
Payer: MEDICARE

## 2025-04-28 VITALS
WEIGHT: 229 LBS | BODY MASS INDEX: 33.92 KG/M2 | DIASTOLIC BLOOD PRESSURE: 84 MMHG | SYSTOLIC BLOOD PRESSURE: 141 MMHG | HEIGHT: 69 IN | HEART RATE: 79 BPM

## 2025-04-28 DIAGNOSIS — G25.0 ESSENTIAL TREMOR: Primary | ICD-10-CM

## 2025-04-28 DIAGNOSIS — R20.2 ARM PARESTHESIA, RIGHT: ICD-10-CM

## 2025-04-28 PROCEDURE — 99213 OFFICE O/P EST LOW 20 MIN: CPT | Performed by: PSYCHIATRY & NEUROLOGY

## 2025-04-28 PROCEDURE — 1036F TOBACCO NON-USER: CPT | Performed by: PSYCHIATRY & NEUROLOGY

## 2025-04-28 PROCEDURE — G2211 COMPLEX E/M VISIT ADD ON: HCPCS | Performed by: PSYCHIATRY & NEUROLOGY

## 2025-04-28 PROCEDURE — 3008F BODY MASS INDEX DOCD: CPT | Performed by: PSYCHIATRY & NEUROLOGY

## 2025-04-28 PROCEDURE — 1160F RVW MEDS BY RX/DR IN RCRD: CPT | Performed by: PSYCHIATRY & NEUROLOGY

## 2025-04-28 PROCEDURE — 3079F DIAST BP 80-89 MM HG: CPT | Performed by: PSYCHIATRY & NEUROLOGY

## 2025-04-28 PROCEDURE — 1123F ACP DISCUSS/DSCN MKR DOCD: CPT | Performed by: PSYCHIATRY & NEUROLOGY

## 2025-04-28 PROCEDURE — 3077F SYST BP >= 140 MM HG: CPT | Performed by: PSYCHIATRY & NEUROLOGY

## 2025-04-28 PROCEDURE — 1159F MED LIST DOCD IN RCRD: CPT | Performed by: PSYCHIATRY & NEUROLOGY

## 2025-04-28 RX ORDER — PROPRANOLOL HYDROCHLORIDE 20 MG/1
20 TABLET ORAL 2 TIMES DAILY
Qty: 180 TABLET | Refills: 3 | Status: SHIPPED | OUTPATIENT
Start: 2025-04-28 | End: 2026-04-28

## 2025-04-28 RX ORDER — PRIMIDONE 50 MG/1
150 TABLET ORAL NIGHTLY
Qty: 270 TABLET | Refills: 3 | Status: SHIPPED | OUTPATIENT
Start: 2025-04-28 | End: 2026-04-28

## 2025-04-28 ASSESSMENT — ENCOUNTER SYMPTOMS
LOSS OF SENSATION IN FEET: 0
DEPRESSION: 0
OCCASIONAL FEELINGS OF UNSTEADINESS: 0
TREMORS: 1

## 2025-04-28 ASSESSMENT — FAHN TOLOSA MARTIN TREMOR (FTM)
UE ARM AT REST: 0
TRUNK TOTAL SCORE: 0
LE LEG FLEXED AT HIPS AND KNEES AT POSTURE: 0
RUE TOTAL SCORE: 3
TOUNGE TOTAL SCORE: 0
HEAD IN REPOSE: 0
DRAWING B RGHT SCORE: 0
UE ARM AT REST: 1
LE AT REST: 0
DRAWING B LEFT SCORE: 3
UE ARMS OUTSTRECHED WRISTS MILDLY EXTENDED FINGERS SPREAD APART: 1
TOUNGE WHEN PROTUDED: 0
UE FINGER TO NOSE AND OTHER ACTIONS: 2
DRAWING B TOTAL SCORE: 3
POURING SCORE: 0
FACE TOTAL: 0
LE TOE TO FINGER IN A FLEXED POSTURE: 0
FACE AT POSTURE WHEN STANDING OR SITTING: 0
TOUNGE AT REST: 0
DRAWING A LEFT SCORE: 3
LLE TOTAL SCORE: 0
HANDWRITING WITH DOMINANT HAND: 1
PART A SUBTOTAL SCORE: 6
UE ARMS OUTSTRECHED WRISTS MILDLY EXTENDED FINGERS SPREAD APART: 0
VOICE IN ACTION: 1
LE LEG FLEXED AT HIPS AND KNEES AT POSTURE: 0
DRAWING C LEFT SCORE: 1
DRAWING C RIGHT SCORE: 1
PART B SUBTOTAL SCORE: 9
RLE TOTAL SCORE: 0
TRUNK  WHEN SITTING OR STANDING: 0
FACE IN REPOSE: 0
DRAWING C TOTAL SCORE: 2
DRAWING A RIGHT SCORE: 0
LUE TOTAL SCORE: 2
LE TOE TO FINGER IN A FLEXED POSTURE: 0
LE AT REST: 0
UE FINGER TO NOSE AND OTHER ACTIONS: 1
VOICE TOTAL SCORE: 1
HEAD AT POSTURE WHEN STANDING OR SITTING: 0
TRUNK IN REPOSE: 0
DRAWING A TOTAL SCORE: 3
HEAD TOTAL SCORE: 0

## 2025-04-28 ASSESSMENT — ANXIETY QUESTIONNAIRES
IF YOU CHECKED OFF ANY PROBLEMS ON THIS QUESTIONNAIRE, HOW DIFFICULT HAVE THESE PROBLEMS MADE IT FOR YOU TO DO YOUR WORK, TAKE CARE OF THINGS AT HOME, OR GET ALONG WITH OTHER PEOPLE: NOT DIFFICULT AT ALL
2. NOT BEING ABLE TO STOP OR CONTROL WORRYING: NOT AT ALL
6. BECOMING EASILY ANNOYED OR IRRITABLE: NOT AT ALL
3. WORRYING TOO MUCH ABOUT DIFFERENT THINGS: NOT AT ALL
1. FEELING NERVOUS, ANXIOUS, OR ON EDGE: NOT AT ALL
5. BEING SO RESTLESS THAT IT IS HARD TO SIT STILL: NOT AT ALL
4. TROUBLE RELAXING: NOT AT ALL
7. FEELING AFRAID AS IF SOMETHING AWFUL MIGHT HAPPEN: NOT AT ALL
GAD7 TOTAL SCORE: 0

## 2025-04-28 NOTE — PROGRESS NOTES
Subjective     Juvenal Bagley Jr. is a right handed  65 y.o. year old male who presents with Tremors.   Visit type: follow up visit     Tremor    Here for follow up of essential tremors. Last seen in October of 2024, at which point primidone was added. He was also having some intermittent R arm tingling but wanted to just observe and do PT for same, he states he cannot remember the last time it happened, improved with PT, does it 3 times a week. Just had a kidney stone, had lithotripsy.       He thinks the tremor may have helped some, but it is maybe just different.  He is at 125 mg at bedtime and continues to take Propranolol 40 mg in am (low dose so not to interfere w exercise regimen). 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 Can do all the things he needs to do, just has to adapt.   Has started a Mediterranean diet and does not drink or smoke, Blood pressure is better.      Handwriting is so so, no change. 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   Has to watch when he holds a plate.  This is the most obvious issue when he notices the tremor, sometimes has to use two hands.   Exercising 7 days a week now.   Gait is good, no shuffling.   NO stiffness and slowness. No balance issues and no falls.   Has some slowness in walking due to hip pain.     His heart rate is usually in the 60s on his apple watch,     He did have reduced arm swing, but no other bradykinesia or rigidity to suggest parkinsonism given the asymmetric tremor, however this is being monitored.     No RBD like behavior, no hyposmia.     Tingling:  Resolved.   Episodes of R arm tingling, no numbness. No clear triggers.   He could be exercising, sleeping or resting. Not clearly positional.   He has had the EMG/NCS, and has started PT. EMG shows  primarily chronic C5 radiculopathy and incidental Carpal tunnel syndrome.   There is no weakness.  No involvement of face or leg or change in sensorium.   Feels like arm falls asleep, no involvement of the hand. It lasts for 5-20 seconds, happens maybe 10 times a day,,   Has not happened today. No alteration in consciousness and no headaches.   He feels that PT has helped.   Did not do MRI c-spine as not sure what we would do w the information and wanted to do PT.     Current Outpatient Medications on File Prior to Visit   Medication Sig Dispense Refill    ascorbic acid (Vitamin C) 1,000 mg tablet Take 1 tablet (1,000 mg) by mouth once daily.      cholecalciferol (Vitamin D-3) 25 MCG (1000 UT) capsule Take 1 capsule (25 mcg) by mouth once daily.      colchicine 0.6 mg tablet TAKE 2 TABLETS BY MOUTH ONCE DAILY FOR 1 DAY, THEN TAKE 1 TABLET 2 TIMES A DAY FOR 7 DAYS. 16 tablet 0    glucosamine-chondroitin 250-200 mg tablet Take 1 tablet by mouth 2 times a day.      lisinopril 40 mg tablet Take 1 tablet (40 mg) by mouth once daily. 90 tablet 3    multivitamin tablet Take 1 tablet by mouth once daily.      primidone (Mysoline) 50 mg tablet Take 2.5 tablets (125 mg) by mouth once daily at bedtime. 225 tablet 3    propranolol (Inderal) 20 mg tablet Take 1 tablet (20 mg) by mouth 2 times a day. 180 tablet 3    [DISCONTINUED] colchicine 0.6 mg tablet Take 2 tablets (1.2 mg) by mouth once daily for 1 day, THEN 1 tablet (0.6 mg) 2 times a day for 7 days. 16 tablet 0    [DISCONTINUED] oxyCODONE (Roxicodone) 5 mg immediate release tablet Take 1 tablet (5 mg) by mouth every 6 hours if needed for severe pain (7 - 10) for up to 3 days. (Patient not taking: Reported on 4/1/2025) 12 tablet 0     No current facility-administered medications on file prior to visit.        Patient Health Questionnaire-2 Score: 0          Review of Systems   Neurological:  Positive for tremors.     All other system have been reviewed and are negative for  complaint.  Objective   Neurological Exam  Mental Status  Awake, alert and oriented to person, place and time.    Cranial Nerves  CN VII: Full and symmetric facial movement.  CN XII: Tongue midline without atrophy or fasciculations.    Motor   Strength is 5/5 throughout all four extremities.    Coordination  Right: Finger-to-nose normal.Left: Finger-to-nose normal.    Gait  Casual gait is normal including stance, stride, and arm swing.      Movement disorder specific exam:  See Fahn Mio Rasheed Tremor rating scale.     Kinetic tremor L UE w slight spill over into rest. Archimedes spirals tremulous on L,   Handwriting is legible.   No bradykinesia. Normal tone.   Walks well, w normal stride length.             Vitals:    04/28/25 0903   BP: 141/84   Pulse: 79              Face at rest 0   Face at posture 0   Face Total 0   Tongue at rest 0   Tongue at posture 0   Tongue total 0   Voice in action 1   Voice total 1   Head at rest 0   Head at posture 0   Head total 0   RUE at rest 1   RUE at posture 1   RUE in action 1   RUE total 3   LUE at rest 0   LUE at posture 0   LUE in action 2   LUE total 2   Trunk at rest 0   Trunk at posture 0   Trunk total 0   RLE at rest 0   RLE at posture 0   RLE in action 0   RLE total 0   LLE at rest 0   LLE at posture 0   LLE in action 0   LLE total 0   Part A subtotal 6   PART B     Handwriting dominant 1   Drawing A - Right 0   Drawing A - Left 3   Drawing A total 3   Drawing B - Right 0   Drawing B - Left 3   Drawing B total 3   Drawing C - Right 1   Drawing C - Left 1   Pouring 0   Drawing C total 2   Part B subtotal 9   PART C     Speaking --   Feeding --   Bringing liquids to mouth --   Hygiene --   Dressing --   Writing --   Working --   Part C subtotal --   Score     Tremor Total Score --                                      Assessment/Plan     65 year old male here for follow up of  ET, since last visit has started primidone in addition to low dose Propranolol (kept low due to  HR) continues to exercise extensively.  Tremors remain about the same.   He has also had R arm paraesthesia episodes, however this resolved with PT,     Continue Propranolol 40 mg daily  Primidone 150 mg nightly  RTC in 9 months.        This is a chronic neurologic condition that requires ongoing care and monitoring. This is a complex, serious condition that needs long term care going forward. Between myself and the patient we will be changing direction of care depending on responses to treatment.   Today we discussed medication options, non medication options for management and various other symptoms that are in relation to this disease.  I will continue to be involved in the care of this patient.

## 2025-04-28 NOTE — LETTER
April 28, 2025     Rudy Handley, APRN-CNP  [address]  [address]  [facility] [address]    Patient: Juvenal Bagley Jr.   YOB: 1960   Date of Visit: 4/28/2025       Dear Dr. Rudy Handley, JUSTO-CNP:    Thank you for referring Jvuenal Bagley to me for evaluation. Below are my notes for this consultation.  If you have questions, please do not hesitate to call me. I look forward to following your patient along with you.       Sincerely,     Michelle Torres MD      CC: No Recipients  ______________________________________________________________________________________    Subjective    Juvenal Bagley Jr. is a right handed  65 y.o. year old male who presents with Tremors.   Visit type: follow up visit     Tremor    Here for follow up of essential tremors. Last seen in October of 2024, at which point primidone was added. He was also having some intermittent R arm tingling but wanted to just observe and do PT for same, he states he cannot remember the last time it happened, improved with PT, does it 3 times a week. Just had a kidney stone, had lithotripsy.       He thinks the tremor may have helped some, but it is maybe just different.  He is at 125 mg at bedtime and continues to take Propranolol 40 mg in am (low dose so not to interfere w exercise regimen). 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 Can do all the things he needs to do, just has to adapt.   Has started a Mediterranean diet and does not drink or smoke, Blood pressure is better.      Handwriting is so so, no change. 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   Has to watch when he holds a plate.  This is the most obvious issue when he notices the tremor, sometimes has to use two hands.   Exercising 7 days a week now.   Gait is good, no  shuffling.   NO stiffness and slowness. No balance issues and no falls.   Has some slowness in walking due to hip pain.     His heart rate is usually in the 60s on his apple watch,     He did have reduced arm swing, but no other bradykinesia or rigidity to suggest parkinsonism given the asymmetric tremor, however this is being monitored.     No RBD like behavior, no hyposmia.     Tingling:  Resolved.   Episodes of R arm tingling, no numbness. No clear triggers.   He could be exercising, sleeping or resting. Not clearly positional.   He has had the EMG/NCS, and has started PT. EMG shows primarily chronic C5 radiculopathy and incidental Carpal tunnel syndrome.   There is no weakness.  No involvement of face or leg or change in sensorium.   Feels like arm falls asleep, no involvement of the hand. It lasts for 5-20 seconds, happens maybe 10 times a day,,   Has not happened today. No alteration in consciousness and no headaches.   He feels that PT has helped.   Did not do MRI c-spine as not sure what we would do w the information and wanted to do PT.     Current Outpatient Medications on File Prior to Visit   Medication Sig Dispense Refill   • ascorbic acid (Vitamin C) 1,000 mg tablet Take 1 tablet (1,000 mg) by mouth once daily.     • cholecalciferol (Vitamin D-3) 25 MCG (1000 UT) capsule Take 1 capsule (25 mcg) by mouth once daily.     • colchicine 0.6 mg tablet TAKE 2 TABLETS BY MOUTH ONCE DAILY FOR 1 DAY, THEN TAKE 1 TABLET 2 TIMES A DAY FOR 7 DAYS. 16 tablet 0   • glucosamine-chondroitin 250-200 mg tablet Take 1 tablet by mouth 2 times a day.     • lisinopril 40 mg tablet Take 1 tablet (40 mg) by mouth once daily. 90 tablet 3   • multivitamin tablet Take 1 tablet by mouth once daily.     • primidone (Mysoline) 50 mg tablet Take 2.5 tablets (125 mg) by mouth once daily at bedtime. 225 tablet 3   • propranolol (Inderal) 20 mg tablet Take 1 tablet (20 mg) by mouth 2 times a day. 180 tablet 3   • [DISCONTINUED]  colchicine 0.6 mg tablet Take 2 tablets (1.2 mg) by mouth once daily for 1 day, THEN 1 tablet (0.6 mg) 2 times a day for 7 days. 16 tablet 0   • [DISCONTINUED] oxyCODONE (Roxicodone) 5 mg immediate release tablet Take 1 tablet (5 mg) by mouth every 6 hours if needed for severe pain (7 - 10) for up to 3 days. (Patient not taking: Reported on 4/1/2025) 12 tablet 0     No current facility-administered medications on file prior to visit.        Patient Health Questionnaire-2 Score: 0          Review of Systems   Neurological:  Positive for tremors.     All other system have been reviewed and are negative for complaint.  Objective  Neurological Exam  Mental Status  Awake, alert and oriented to person, place and time.    Cranial Nerves  CN VII: Full and symmetric facial movement.  CN XII: Tongue midline without atrophy or fasciculations.    Motor   Strength is 5/5 throughout all four extremities.    Coordination  Right: Finger-to-nose normal.Left: Finger-to-nose normal.    Gait  Casual gait is normal including stance, stride, and arm swing.      Movement disorder specific exam:  See Fahn Mio Rasheed Tremor rating scale.     Kinetic tremor L UE w slight spill over into rest. Archimedes spirals tremulous on L,   Handwriting is legible.   No bradykinesia. Normal tone.   Walks well, w normal stride length.             Vitals:    04/28/25 0903   BP: 141/84   Pulse: 79              Face at rest 0   Face at posture 0   Face Total 0   Tongue at rest 0   Tongue at posture 0   Tongue total 0   Voice in action 1   Voice total 1   Head at rest 0   Head at posture 0   Head total 0   RUE at rest 1   RUE at posture 1   RUE in action 1   RUE total 3   LUE at rest 0   LUE at posture 0   LUE in action 2   LUE total 2   Trunk at rest 0   Trunk at posture 0   Trunk total 0   RLE at rest 0   RLE at posture 0   RLE in action 0   RLE total 0   LLE at rest 0   LLE at posture 0   LLE in action 0   LLE total 0   Part A subtotal 6   PART B      Handwriting dominant 1   Drawing A - Right 0   Drawing A - Left 3   Drawing A total 3   Drawing B - Right 0   Drawing B - Left 3   Drawing B total 3   Drawing C - Right 1   Drawing C - Left 1   Pouring 0   Drawing C total 2   Part B subtotal 9   PART C     Speaking --   Feeding --   Bringing liquids to mouth --   Hygiene --   Dressing --   Writing --   Working --   Part C subtotal --   Score     Tremor Total Score --                                      Assessment/Plan    65 year old male here for follow up of  ET, since last visit has started primidone in addition to low dose Propranolol (kept low due to HR) continues to exercise extensively.  Tremors remain about the same.   He has also had R arm paraesthesia episodes, however this resolved with PT,     Continue Propranolol 40 mg daily  Primidone 150 mg nightly  RTC in 9 months.        This is a chronic neurologic condition that requires ongoing care and monitoring. This is a complex, serious condition that needs long term care going forward. Between myself and the patient we will be changing direction of care depending on responses to treatment.   Today we discussed medication options, non medication options for management and various other symptoms that are in relation to this disease.  I will continue to be involved in the care of this patient.

## 2025-04-28 NOTE — PATIENT INSTRUCTIONS
It was nice to see you today.   Continue Propranolol 40 mg daily  Primidone 150 mg nightly  RTC in 9 months.

## 2025-04-29 ENCOUNTER — HOSPITAL ENCOUNTER (OUTPATIENT)
Dept: RADIOLOGY | Facility: CLINIC | Age: 65
Discharge: HOME | End: 2025-04-29
Payer: MEDICARE

## 2025-04-29 DIAGNOSIS — N20.0 CALCULUS OF KIDNEY: ICD-10-CM

## 2025-04-29 PROCEDURE — 74018 RADEX ABDOMEN 1 VIEW: CPT

## 2025-04-30 ENCOUNTER — PATIENT OUTREACH (OUTPATIENT)
Dept: PRIMARY CARE | Facility: CLINIC | Age: 65
End: 2025-04-30
Payer: MEDICARE

## 2025-04-30 LAB
ANION GAP SERPL CALCULATED.4IONS-SCNC: 11 MMOL/L (CALC) (ref 7–17)
BUN SERPL-MCNC: 16 MG/DL (ref 7–25)
BUN/CREAT SERPL: NORMAL (CALC) (ref 6–22)
CALCIUM SERPL-MCNC: 9.5 MG/DL (ref 8.6–10.3)
CHLORIDE SERPL-SCNC: 103 MMOL/L (ref 98–110)
CO2 SERPL-SCNC: 24 MMOL/L (ref 20–32)
CREAT SERPL-MCNC: 0.94 MG/DL (ref 0.7–1.35)
EGFRCR SERPLBLD CKD-EPI 2021: 89 ML/MIN/1.73M2
GLUCOSE SERPL-MCNC: 79 MG/DL (ref 65–139)
POTASSIUM SERPL-SCNC: 4.8 MMOL/L (ref 3.5–5.3)
SODIUM SERPL-SCNC: 138 MMOL/L (ref 135–146)

## 2025-05-06 DIAGNOSIS — M10.9 ACUTE GOUT INVOLVING TOE OF RIGHT FOOT, UNSPECIFIED CAUSE: Primary | ICD-10-CM

## 2025-05-06 RX ORDER — ALLOPURINOL 100 MG/1
TABLET ORAL
Qty: 45 TABLET | Refills: 0 | Status: SHIPPED | OUTPATIENT
Start: 2025-05-06 | End: 2025-06-05

## 2025-06-02 ENCOUNTER — PATIENT OUTREACH (OUTPATIENT)
Dept: PRIMARY CARE | Facility: CLINIC | Age: 65
End: 2025-06-02
Payer: MEDICARE

## 2025-06-06 DIAGNOSIS — M10.9 ACUTE GOUT INVOLVING TOE OF RIGHT FOOT, UNSPECIFIED CAUSE: ICD-10-CM

## 2025-06-16 DIAGNOSIS — M10.9 ACUTE GOUT OF RIGHT FOOT, UNSPECIFIED CAUSE: Primary | ICD-10-CM

## 2025-06-16 RX ORDER — ALLOPURINOL 100 MG/1
200 TABLET ORAL DAILY
Qty: 60 TABLET | Refills: 0 | Status: SHIPPED | OUTPATIENT
Start: 2025-06-16 | End: 2025-06-19 | Stop reason: SDUPTHER

## 2025-06-18 LAB — URATE SERPL-MCNC: 8.1 MG/DL (ref 4–8)

## 2025-06-19 DIAGNOSIS — M10.9 ACUTE GOUT OF RIGHT FOOT, UNSPECIFIED CAUSE: ICD-10-CM

## 2025-06-19 RX ORDER — ALLOPURINOL 100 MG/1
200 TABLET ORAL DAILY
Qty: 60 TABLET | Refills: 2 | Status: SHIPPED | OUTPATIENT
Start: 2025-06-19 | End: 2025-09-17

## 2025-07-22 ENCOUNTER — TELEPHONE (OUTPATIENT)
Dept: PRIMARY CARE | Facility: CLINIC | Age: 65
End: 2025-07-22
Payer: MEDICARE

## 2025-07-22 DIAGNOSIS — M10.9 ACUTE GOUT OF RIGHT FOOT, UNSPECIFIED CAUSE: ICD-10-CM

## 2025-07-22 NOTE — TELEPHONE ENCOUNTER
Refill request (patient is having a flare up and is down to 1 pill)    colchicine 0.6 mg tablet       Please send to [facility] in [place]

## 2025-07-23 ENCOUNTER — HOSPITAL ENCOUNTER (OUTPATIENT)
Dept: RADIOLOGY | Facility: CLINIC | Age: 65
Discharge: HOME | End: 2025-07-23
Payer: MEDICARE

## 2025-07-23 ENCOUNTER — LAB REQUISITION (OUTPATIENT)
Dept: LAB | Facility: HOSPITAL | Age: 65
End: 2025-07-23

## 2025-07-23 ENCOUNTER — OFFICE VISIT (OUTPATIENT)
Dept: PRIMARY CARE | Facility: CLINIC | Age: 65
End: 2025-07-23
Payer: MEDICARE

## 2025-07-23 ENCOUNTER — RESULTS FOLLOW-UP (OUTPATIENT)
Dept: PRIMARY CARE | Facility: CLINIC | Age: 65
End: 2025-07-23

## 2025-07-23 VITALS
SYSTOLIC BLOOD PRESSURE: 124 MMHG | DIASTOLIC BLOOD PRESSURE: 82 MMHG | TEMPERATURE: 98.5 F | OXYGEN SATURATION: 98 % | HEART RATE: 50 BPM | BODY MASS INDEX: 32.05 KG/M2 | RESPIRATION RATE: 12 BRPM | WEIGHT: 217 LBS

## 2025-07-23 DIAGNOSIS — M25.441 EFFUSION, RIGHT HAND: ICD-10-CM

## 2025-07-23 DIAGNOSIS — M25.441 FINGER JOINT SWELLING, RIGHT: Primary | ICD-10-CM

## 2025-07-23 DIAGNOSIS — M25.441 FINGER JOINT SWELLING, RIGHT: ICD-10-CM

## 2025-07-23 DIAGNOSIS — M10.9 ACUTE GOUTY ARTHROPATHY: Primary | ICD-10-CM

## 2025-07-23 LAB
ALBUMIN SERPL BCP-MCNC: 4.5 G/DL (ref 3.4–5)
ALP SERPL-CCNC: 73 U/L (ref 33–136)
ALT SERPL W P-5'-P-CCNC: 25 U/L (ref 10–52)
ANION GAP SERPL CALC-SCNC: 12 MMOL/L (ref 10–20)
AST SERPL W P-5'-P-CCNC: 22 U/L (ref 9–39)
BILIRUB SERPL-MCNC: 0.5 MG/DL (ref 0–1.2)
BUN SERPL-MCNC: 18 MG/DL (ref 6–23)
CALCIUM SERPL-MCNC: 9.4 MG/DL (ref 8.6–10.3)
CHLORIDE SERPL-SCNC: 105 MMOL/L (ref 98–107)
CO2 SERPL-SCNC: 27 MMOL/L (ref 21–32)
CREAT SERPL-MCNC: 0.92 MG/DL (ref 0.5–1.3)
EGFRCR SERPLBLD CKD-EPI 2021: >90 ML/MIN/1.73M*2
ERYTHROCYTE [DISTWIDTH] IN BLOOD BY AUTOMATED COUNT: 13.2 % (ref 11.5–14.5)
GLUCOSE SERPL-MCNC: 129 MG/DL (ref 74–99)
HCT VFR BLD AUTO: 39.9 % (ref 41–52)
HGB BLD-MCNC: 13.4 G/DL (ref 13.5–17.5)
MCH RBC QN AUTO: 32.8 PG (ref 26–34)
MCHC RBC AUTO-ENTMCNC: 33.6 G/DL (ref 32–36)
MCV RBC AUTO: 98 FL (ref 80–100)
NRBC BLD-RTO: 0 /100 WBCS (ref 0–0)
PLATELET # BLD AUTO: 169 X10*3/UL (ref 150–450)
POTASSIUM SERPL-SCNC: 4.6 MMOL/L (ref 3.5–5.3)
PROT SERPL-MCNC: 6.5 G/DL (ref 6.4–8.2)
RBC # BLD AUTO: 4.08 X10*6/UL (ref 4.5–5.9)
SODIUM SERPL-SCNC: 139 MMOL/L (ref 136–145)
WBC # BLD AUTO: 5.8 X10*3/UL (ref 4.4–11.3)

## 2025-07-23 PROCEDURE — 3074F SYST BP LT 130 MM HG: CPT | Performed by: NURSE PRACTITIONER

## 2025-07-23 PROCEDURE — 99214 OFFICE O/P EST MOD 30 MIN: CPT | Performed by: NURSE PRACTITIONER

## 2025-07-23 PROCEDURE — 73140 X-RAY EXAM OF FINGER(S): CPT | Mod: RIGHT SIDE | Performed by: RADIOLOGY

## 2025-07-23 PROCEDURE — 1159F MED LIST DOCD IN RCRD: CPT | Performed by: NURSE PRACTITIONER

## 2025-07-23 PROCEDURE — 1160F RVW MEDS BY RX/DR IN RCRD: CPT | Performed by: NURSE PRACTITIONER

## 2025-07-23 PROCEDURE — 85027 COMPLETE CBC AUTOMATED: CPT

## 2025-07-23 PROCEDURE — 80053 COMPREHEN METABOLIC PANEL: CPT

## 2025-07-23 PROCEDURE — 3079F DIAST BP 80-89 MM HG: CPT | Performed by: NURSE PRACTITIONER

## 2025-07-23 PROCEDURE — 73140 X-RAY EXAM OF FINGER(S): CPT | Mod: RT

## 2025-07-23 RX ORDER — METHYLPREDNISOLONE 4 MG/1
TABLET ORAL
Qty: 21 TABLET | Refills: 0 | Status: SHIPPED | OUTPATIENT
Start: 2025-07-23 | End: 2025-07-29

## 2025-07-23 RX ORDER — CEPHALEXIN 500 MG/1
500 CAPSULE ORAL 2 TIMES DAILY
Qty: 14 CAPSULE | Refills: 0 | Status: SHIPPED | OUTPATIENT
Start: 2025-07-23 | End: 2025-07-30

## 2025-07-23 NOTE — TELEPHONE ENCOUNTER
Spoke to patient and relayed results of lab work which was normal. 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 Pt's uric acid level is elevated. Patient is taking the medrol leslee and started on the keflex. He will continue on the plan of care and follow up as needed. No further questions per pt

## 2025-07-23 NOTE — PROGRESS NOTES
Patient has right pointer finger swelling for the past three days. He has a history of gout and is on allopurinol. Pt says that he took colchicine and did not work, so he wanted to make sure it was not an infection. Feeling well otherwise.     Review of Systems   Constitutional:  Negative for appetite change, fatigue and fever.   HENT: Negative.     Respiratory: Negative.     Gastrointestinal: Negative.    Musculoskeletal:  Positive for joint swelling.   Skin:         Redness and tenderness of the right pointer finger     Objective   /82   Pulse 50   Temp 36.9 °C (98.5 °F) (Temporal)   Resp 12   Wt 98.4 kg (217 lb)   SpO2 98%   BMI 32.05 kg/m²     Physical Exam  Vitals reviewed.   Constitutional:       General: He is not in acute distress.     Appearance: Normal appearance. He is not toxic-appearing.   HENT:      Head: Atraumatic.     Cardiovascular:      Rate and Rhythm: Normal rate and regular rhythm.      Heart sounds: Normal heart sounds. No murmur heard.    Musculoskeletal:        Hands:       Comments: Redness and swelling of the right finger outlined above. It is hard for him to move the finger. No open skin. No discharge/drainage     Skin:     General: Skin is warm and dry.     Neurological:      General: No focal deficit present.      Mental Status: He is alert.     Assessment/Plan   Problem List Items Addressed This Visit    None  Visit Diagnoses         Codes      Finger joint swelling, right    -  Primary M25.441    Relevant Medications    methylPREDNISolone (Medrol Dospak) 4 mg tablets    cephalexin (Keflex) 500 mg capsule    Other Relevant Orders    CBC    Comprehensive Metabolic Panel    XR fingers right 2+ views (Completed)        Will get xray of the right finger. Will also get CBC and CMP to rule out infection. Patient has outstanding uric acid lab order from his PCP that he will get done as well. Will start pt on medrol leslee and keflex. Pt had vomiting with ceclor once, so I advised  that he is to monitor for any potential side effects. Pt reminded to avoid other anti-inflammatories while on the steroids; he agreed. Advised ER for any worsening swelling, redness, pain or new/concerning symptoms; he agreed. Will call pt when results become available. Pt to follow up in 2-3 days if no better despite the use of the medications.

## 2025-07-24 ENCOUNTER — TELEPHONE (OUTPATIENT)
Dept: PRIMARY CARE | Facility: CLINIC | Age: 65
End: 2025-07-24
Payer: MEDICARE

## 2025-07-24 RX ORDER — COLCHICINE 0.6 MG/1
0.6 TABLET ORAL DAILY
Qty: 16 TABLET | Refills: 0 | Status: SHIPPED | OUTPATIENT
Start: 2025-07-24

## 2025-07-24 ASSESSMENT — ENCOUNTER SYMPTOMS
FEVER: 0
GASTROINTESTINAL NEGATIVE: 1
APPETITE CHANGE: 0
JOINT SWELLING: 1
FATIGUE: 0
RESPIRATORY NEGATIVE: 1

## 2025-07-24 NOTE — TELEPHONE ENCOUNTER
Spoke to patient and he is feeling better. Finger is less red and swollen and improving. He will continue plan of care.

## 2025-07-25 DIAGNOSIS — M10.9 ACUTE GOUT OF RIGHT FOOT, UNSPECIFIED CAUSE: Primary | ICD-10-CM

## 2025-07-25 RX ORDER — ALLOPURINOL 300 MG/1
300 TABLET ORAL DAILY
Qty: 90 TABLET | Refills: 1 | Status: SHIPPED | OUTPATIENT
Start: 2025-07-25 | End: 2026-01-21

## 2025-07-26 LAB — URATE SERPL-MCNC: 5.6 MG/DL (ref 4–8)

## 2025-08-07 ENCOUNTER — OFFICE VISIT (OUTPATIENT)
Dept: PRIMARY CARE | Facility: CLINIC | Age: 65
End: 2025-08-07
Payer: MEDICARE

## 2025-08-07 VITALS
BODY MASS INDEX: 31.03 KG/M2 | OXYGEN SATURATION: 99 % | TEMPERATURE: 98.2 F | HEART RATE: 55 BPM | DIASTOLIC BLOOD PRESSURE: 80 MMHG | RESPIRATION RATE: 16 BRPM | SYSTOLIC BLOOD PRESSURE: 130 MMHG | WEIGHT: 210.13 LBS

## 2025-08-07 DIAGNOSIS — M54.9 MID-BACK PAIN, ACUTE: ICD-10-CM

## 2025-08-07 DIAGNOSIS — M62.830 SPASM OF BOTH TRAPEZIUS MUSCLES: Primary | ICD-10-CM

## 2025-08-07 PROBLEM — M25.562 KNEE PAIN, LEFT: Status: RESOLVED | Noted: 2023-10-14 | Resolved: 2025-08-07

## 2025-08-07 PROCEDURE — 3079F DIAST BP 80-89 MM HG: CPT | Performed by: NURSE PRACTITIONER

## 2025-08-07 PROCEDURE — 1036F TOBACCO NON-USER: CPT | Performed by: NURSE PRACTITIONER

## 2025-08-07 PROCEDURE — 99213 OFFICE O/P EST LOW 20 MIN: CPT | Performed by: NURSE PRACTITIONER

## 2025-08-07 PROCEDURE — 1159F MED LIST DOCD IN RCRD: CPT | Performed by: NURSE PRACTITIONER

## 2025-08-07 PROCEDURE — 3075F SYST BP GE 130 - 139MM HG: CPT | Performed by: NURSE PRACTITIONER

## 2025-08-07 RX ORDER — NAPROXEN 500 MG/1
500 TABLET ORAL 2 TIMES DAILY PRN
Qty: 30 TABLET | Refills: 0 | Status: SHIPPED | OUTPATIENT
Start: 2025-08-07 | End: 2025-09-06

## 2025-08-07 RX ORDER — CYCLOBENZAPRINE HCL 5 MG
5 TABLET ORAL NIGHTLY PRN
Qty: 30 TABLET | Refills: 0 | Status: SHIPPED | OUTPATIENT
Start: 2025-08-07 | End: 2025-10-06

## 2025-08-07 ASSESSMENT — ENCOUNTER SYMPTOMS
MYALGIAS: 0
WHEEZING: 0
ABDOMINAL PAIN: 0
PALPITATIONS: 0
FEVER: 0
DIARRHEA: 0
BACK PAIN: 1
DIZZINESS: 0
WEAKNESS: 0
NAUSEA: 0
LEG PAIN: 0
FATIGUE: 0
COUGH: 0
DYSURIA: 0
ARTHRALGIAS: 0
NUMBNESS: 0
TINGLING: 0
CHILLS: 0
VOMITING: 0
HEADACHES: 0
CONSTIPATION: 0

## 2025-08-07 NOTE — PROGRESS NOTES
Subjective   Patient ID: Juvenal Bagley Jr. is a 65 y.o. male who presents for Back Pain.    Florentino has muscle spasm on both sides of his low back. He's exercising MWF for hips and planks, T/Th/Sat for neck and upper body. Over 1000 miles outdoor bike and 500 miles indoor.     He has to stop riding and stretch his back when the pain gets worse. OTC anti-inflammatories have helped slightly.     Activity makes it worse, he notices the most pain in the evening. Still exercising, on his feet while working, and able to complete tasks at home.    Back Pain  This is a recurrent problem. The current episode started more than 1 month ago. The problem occurs constantly. The problem has been gradually worsening since onset. The pain is present in the lumbar spine. The quality of the pain is described as aching. The pain does not radiate. The pain is at a severity of 6/10. The pain is moderate. The pain is The same all the time. The symptoms are aggravated by standing. Stiffness is present All day. Pertinent negatives include no abdominal pain, chest pain, dysuria, fever, headaches, leg pain, numbness, tingling or weakness. Treatments tried: Tylenol and Aspirin. The treatment provided mild relief.        Review of Systems   Constitutional:  Negative for chills, fatigue and fever.   Respiratory:  Negative for cough and wheezing.    Cardiovascular:  Negative for chest pain and palpitations.   Gastrointestinal:  Negative for abdominal pain, constipation, diarrhea, nausea and vomiting.   Genitourinary:  Negative for dysuria.   Musculoskeletal:  Positive for back pain. Negative for arthralgias and myalgias.   Neurological:  Negative for dizziness, tingling, weakness, numbness and headaches.   All other systems reviewed and are negative.      Objective   /80   Pulse 55   Temp 36.8 °C (98.2 °F) (Temporal)   Resp 16   Wt 95.3 kg (210 lb 2 oz)   SpO2 99%   BMI 31.03 kg/m²     Physical Exam  Constitutional:       General:  He is not in acute distress.     Appearance: He is not ill-appearing.     Cardiovascular:      Rate and Rhythm: Normal rate and regular rhythm.   Pulmonary:      Effort: Pulmonary effort is normal.      Breath sounds: Normal breath sounds.   Abdominal:      General: Abdomen is flat. Bowel sounds are normal.      Palpations: Abdomen is soft.     Musculoskeletal:         General: Normal range of motion.      Lumbar back: Spasms and tenderness (bilateral paraspinal) present. No swelling, edema, deformity or signs of trauma. Normal range of motion.        Back:      Skin:     General: Skin is warm and dry.     Neurological:      Mental Status: He is alert.     Psychiatric:         Mood and Affect: Mood normal.         Behavior: Behavior normal.         Assessment/Plan   Problem List Items Addressed This Visit    None  Visit Diagnoses         Spasm of both trapezius muscles    -  Primary    Relevant Medications    naproxen (Naprosyn) 500 mg tablet    cyclobenzaprine (Flexeril) 5 mg tablet    Other Relevant Orders    Referral to Physical Therapy      Mid-back pain, acute        Relevant Medications    naproxen (Naprosyn) 500 mg tablet    cyclobenzaprine (Flexeril) 5 mg tablet    Other Relevant Orders    Referral to Physical Therapy          Patient Instructions   Patient to start taking naproxen twice a day as ordered, and flexeril as needed. Referred to PT. Call the office if no improvement in 1-2 weeks. Follow-up as needed.

## 2025-08-08 NOTE — PATIENT INSTRUCTIONS
Patient to start taking naproxen twice a day as ordered, and flexeril as needed. Referred to PT. Call the office if no improvement in 1-2 weeks. Follow-up as needed.

## 2025-08-13 ENCOUNTER — APPOINTMENT (OUTPATIENT)
Dept: CARDIOLOGY | Facility: CLINIC | Age: 65
End: 2025-08-13
Payer: MEDICARE

## 2025-08-22 DIAGNOSIS — M10.9 ACUTE GOUT OF RIGHT FOOT, UNSPECIFIED CAUSE: ICD-10-CM

## 2025-09-03 ENCOUNTER — EVALUATION (OUTPATIENT)
Dept: PHYSICAL THERAPY | Facility: CLINIC | Age: 65
End: 2025-09-03
Payer: MEDICARE

## 2025-09-03 DIAGNOSIS — M54.9 BACK PAIN: Primary | ICD-10-CM

## 2025-09-03 PROCEDURE — 97161 PT EVAL LOW COMPLEX 20 MIN: CPT | Mod: GP

## 2025-09-03 PROCEDURE — 97110 THERAPEUTIC EXERCISES: CPT | Mod: GP

## 2025-10-14 ENCOUNTER — APPOINTMENT (OUTPATIENT)
Dept: PRIMARY CARE | Facility: CLINIC | Age: 65
End: 2025-10-14
Payer: MEDICARE

## 2025-10-20 ENCOUNTER — APPOINTMENT (OUTPATIENT)
Dept: PRIMARY CARE | Facility: CLINIC | Age: 65
End: 2025-10-20
Payer: MEDICARE

## 2026-02-11 ENCOUNTER — APPOINTMENT (OUTPATIENT)
Dept: CARDIOLOGY | Facility: CLINIC | Age: 66
End: 2026-02-11
Payer: MEDICARE

## 2026-08-13 ENCOUNTER — APPOINTMENT (OUTPATIENT)
Dept: CARDIOLOGY | Facility: CLINIC | Age: 66
End: 2026-08-13
Payer: MEDICARE

## (undated) DEVICE — Y-ADAPTER, GATEWAY ADVANTAGE

## (undated) DEVICE — SHEATH, URETERAL ACCESS, NAVIGATOR 11/13FR X 46CM

## (undated) DEVICE — GLOVE, SURGICAL, PROTEXIS PI ORTHO, 7.0, PF, LF

## (undated) DEVICE — Device

## (undated) DEVICE — IRRIGATION KIT, PUMPING, SINGLE ACTION, SYRINGE, 10 CC

## (undated) DEVICE — NEEDLE, HYPODERMIC, SPECIALTY, REGULAR WALL, SHORT BEVEL, 18 G X 1.5 IN

## (undated) DEVICE — TOWEL PACK, STERILE, 4/PACK, BLUE

## (undated) DEVICE — BASKET, STONE, RETRIEVAL, NITINOL (4WIRE, 1.9 0 TIP)

## (undated) DEVICE — DILATOR SET, COAXIAL STYLET, 8/10

## (undated) DEVICE — DRAPE PACK, LAVH, W/ATTACHED LEGGINGS, W/POUCH, 100 X 114 IN, LF, STERILE

## (undated) DEVICE — GUIDEWIRE, .035 X 150 ANGLED 3CM

## (undated) DEVICE — LASER FIBER, HOLMIUM MP 200

## (undated) DEVICE — SOLUTION, IRRIGATION, STERILE WATER, 1000 ML, POUR BOTTLE

## (undated) DEVICE — SOLUTION, IRRIGATION, SODIUM CHLORIDE 0.9%, 1000 ML, POUR BOTTLE

## (undated) DEVICE — SCOPE, LITHOVUE SUD ONLY, GLOBAL STANDARD

## (undated) DEVICE — SYRINGE, LUER LOCK, 12ML

## (undated) DEVICE — GLOVE, SURGICAL, PROTEXIS PI W/NEU-THERA, 7.5, PF, LF